# Patient Record
Sex: FEMALE | Race: WHITE | NOT HISPANIC OR LATINO | Employment: OTHER | URBAN - NONMETROPOLITAN AREA
[De-identification: names, ages, dates, MRNs, and addresses within clinical notes are randomized per-mention and may not be internally consistent; named-entity substitution may affect disease eponyms.]

---

## 2017-01-05 ENCOUNTER — AMBULATORY - GICH (OUTPATIENT)
Dept: GERIATRICS | Facility: OTHER | Age: 82
End: 2017-01-05

## 2017-01-05 DIAGNOSIS — I10 ESSENTIAL (PRIMARY) HYPERTENSION: ICD-10-CM

## 2017-02-13 ENCOUNTER — AMBULATORY - GICH (OUTPATIENT)
Dept: GERIATRICS | Facility: OTHER | Age: 82
End: 2017-02-13

## 2017-02-13 DIAGNOSIS — F02.80 DEMENTIA IN OTHER DISEASES CLASSIFIED ELSEWHERE WITHOUT BEHAVIORAL DISTURBANCE: ICD-10-CM

## 2017-02-13 DIAGNOSIS — G30.9 ALZHEIMER'S DISEASE (H): ICD-10-CM

## 2017-02-13 DIAGNOSIS — F02.80 ALZHEIMER'S DISEASE (H): ICD-10-CM

## 2017-02-15 ENCOUNTER — HOSPITAL - GICH (OUTPATIENT)
Dept: LAB | Facility: OTHER | Age: 82
End: 2017-02-15

## 2017-02-15 DIAGNOSIS — R63.4 ABNORMAL WEIGHT LOSS: ICD-10-CM

## 2017-02-15 LAB
A/G RATIO - HISTORICAL: 1.3 (ref 1–2)
ALBUMIN SERPL-MCNC: 3.1 G/DL (ref 3.5–5.7)
ALP SERPL-CCNC: 48 IU/L (ref 34–104)
ALT (SGPT) - HISTORICAL: 5 IU/L (ref 7–52)
ANION GAP - HISTORICAL: 6 (ref 5–18)
AST SERPL-CCNC: 9 IU/L (ref 13–39)
BILIRUB SERPL-MCNC: 0.6 MG/DL (ref 0.3–1)
BUN SERPL-MCNC: 14 MG/DL (ref 7–25)
BUN/CREAT RATIO - HISTORICAL: 16
CALCIUM SERPL-MCNC: 8.9 MG/DL (ref 8.6–10.3)
CHLORIDE SERPLBLD-SCNC: 109 MMOL/L (ref 98–107)
CO2 SERPL-SCNC: 26 MMOL/L (ref 21–31)
CREAT SERPL-MCNC: 0.9 MG/DL (ref 0.7–1.3)
ERYTHROCYTE [DISTWIDTH] IN BLOOD BY AUTOMATED COUNT: 13.2 % (ref 11.5–15.5)
GFR IF NOT AFRICAN AMERICAN - HISTORICAL: 60 ML/MIN/1.73M2
GLOBULIN - HISTORICAL: 2.4 G/DL (ref 2–3.7)
GLUCOSE SERPL-MCNC: 79 MG/DL (ref 70–105)
HCT VFR BLD AUTO: 38.3 % (ref 33–51)
HEMOGLOBIN: 13 G/DL (ref 12–16)
MCH RBC QN AUTO: 29.5 PG (ref 26–34)
MCHC RBC AUTO-ENTMCNC: 33.8 G/DL (ref 32–36)
MCV RBC AUTO: 87 FL (ref 80–100)
PLATELET # BLD AUTO: 156 THOU/CU MM (ref 140–440)
PMV BLD: 8.7 FL (ref 6.5–11)
POTASSIUM SERPL-SCNC: 4.1 MMOL/L (ref 3.5–5.1)
PROT SERPL-MCNC: 5.5 G/DL (ref 6.4–8.9)
RED BLOOD COUNT - HISTORICAL: 4.39 MIL/CU MM (ref 4–5.2)
SODIUM SERPL-SCNC: 141 MMOL/L (ref 133–143)
TSH - HISTORICAL: 1.49 UIU/ML (ref 0.34–5.6)
WHITE BLOOD COUNT - HISTORICAL: 3.2 THOU/CU MM (ref 4.5–11)

## 2017-03-08 ENCOUNTER — HISTORY (OUTPATIENT)
Dept: EMERGENCY MEDICINE | Facility: OTHER | Age: 82
End: 2017-03-08

## 2017-04-05 ENCOUNTER — AMBULATORY - GICH (OUTPATIENT)
Dept: GERIATRICS | Facility: OTHER | Age: 82
End: 2017-04-05

## 2017-04-05 DIAGNOSIS — G30.1 ALZHEIMER'S DISEASE WITH LATE ONSET (CODE) (H): ICD-10-CM

## 2017-04-05 DIAGNOSIS — I10 ESSENTIAL (PRIMARY) HYPERTENSION: ICD-10-CM

## 2017-04-05 DIAGNOSIS — F02.818 DEMENTIA IN OTHER DISEASES CLASSIFIED ELSEWHERE WITH BEHAVIORAL DISTURBANCE: ICD-10-CM

## 2017-04-05 DIAGNOSIS — E03.9 HYPOTHYROIDISM: ICD-10-CM

## 2017-06-12 ENCOUNTER — AMBULATORY - GICH (OUTPATIENT)
Dept: GERIATRICS | Facility: OTHER | Age: 82
End: 2017-06-12

## 2017-06-12 DIAGNOSIS — I48.20 CHRONIC ATRIAL FIBRILLATION (H): ICD-10-CM

## 2017-07-07 ENCOUNTER — AMBULATORY - GICH (OUTPATIENT)
Dept: SCHEDULING | Facility: OTHER | Age: 82
End: 2017-07-07

## 2017-08-09 ENCOUNTER — AMBULATORY - GICH (OUTPATIENT)
Dept: GERIATRICS | Facility: OTHER | Age: 82
End: 2017-08-09

## 2017-08-09 DIAGNOSIS — I48.20 CHRONIC ATRIAL FIBRILLATION (H): ICD-10-CM

## 2017-08-09 DIAGNOSIS — F02.80 DEMENTIA IN OTHER DISEASES CLASSIFIED ELSEWHERE WITHOUT BEHAVIORAL DISTURBANCE: ICD-10-CM

## 2017-08-09 DIAGNOSIS — G30.1 ALZHEIMER'S DISEASE WITH LATE ONSET (CODE) (H): ICD-10-CM

## 2017-09-14 ENCOUNTER — AMBULATORY - GICH (OUTPATIENT)
Dept: SCHEDULING | Facility: OTHER | Age: 82
End: 2017-09-14

## 2017-10-09 ENCOUNTER — AMBULATORY - GICH (OUTPATIENT)
Dept: GERIATRICS | Facility: OTHER | Age: 82
End: 2017-10-09

## 2017-10-09 DIAGNOSIS — I10 ESSENTIAL (PRIMARY) HYPERTENSION: ICD-10-CM

## 2017-10-18 ENCOUNTER — HOSPITAL - GICH (OUTPATIENT)
Dept: LAB | Facility: OTHER | Age: 82
End: 2017-10-18

## 2017-10-18 DIAGNOSIS — E03.9 HYPOTHYROIDISM: ICD-10-CM

## 2017-10-18 LAB
ALBUMIN SERPL-MCNC: 3.5 G/DL (ref 3.5–5.7)
PREALBUMIN - HISTORICAL: 23.9 MG/DL (ref 17–34)
T4 FREE SERPL-MCNC: 0.82 NG/DL (ref 0.58–1.64)
TSH - HISTORICAL: 4.31 UIU/ML (ref 0.34–5.6)

## 2017-12-21 ENCOUNTER — AMBULATORY - GICH (OUTPATIENT)
Dept: GERIATRICS | Facility: OTHER | Age: 82
End: 2017-12-21

## 2017-12-21 DIAGNOSIS — I10 ESSENTIAL (PRIMARY) HYPERTENSION: ICD-10-CM

## 2017-12-21 DIAGNOSIS — F02.80 DEMENTIA IN OTHER DISEASES CLASSIFIED ELSEWHERE WITHOUT BEHAVIORAL DISTURBANCE: ICD-10-CM

## 2017-12-21 DIAGNOSIS — G30.1 ALZHEIMER'S DISEASE WITH LATE ONSET (CODE) (H): ICD-10-CM

## 2017-12-22 ENCOUNTER — COMMUNICATION - GICH (OUTPATIENT)
Dept: FAMILY MEDICINE | Facility: OTHER | Age: 82
End: 2017-12-22

## 2017-12-27 NOTE — PROGRESS NOTES
Patient Information     Patient Name MRN Sex Lynne Covarrubias [id number] Female 1933      Progress Notes by Yudith Rubio NP at 10/9/2017  3:40 AM     Author:  Yudith Rubio NP Service:  (none) Author Type:  PHYS- Nurse Practitioner     Filed:  10/10/2017  7:29 AM Encounter Date:  10/9/2017 Status:  Signed     :  Yudith Rubio NP (PHYS- Nurse Practitioner)            This note has been dictated. The encounter number is [phone].

## 2017-12-27 NOTE — PROGRESS NOTES
Patient Information     Patient Name MRN Sex Lynne Covarrubias [id number] Female 1933      Progress Notes by Yudith Rubio NP at 2017  3:40 AM     Author:  Yudith Rubio NP Service:  (none) Author Type:  PHYS- Nurse Practitioner     Filed:  2017  7:35 AM Encounter Date:  2017 Status:  Signed     :  Yudith Rubio NP (PHYS- Nurse Practitioner)            This note has been dictated. The encounter number is [phone].

## 2017-12-28 NOTE — PROGRESS NOTES
Patient Information     Patient Name MRN Lynne Umana [id number] Female 1933      Progress Notes by French Katz MD at 2017  5:40 AM     Author:  French Katz MD Service:  (none) Author Type:  Physician     Filed:  2017  9:33 AM Encounter Date:  2017 Status:  Signed     :  French Katz MD (Physician)            .

## 2017-12-29 NOTE — H&P
Patient Information     Patient Name MRN Sex     Mini Dominguez [id number] Female 1933      H&P signed by French Katz MD at 2017  7:08 AM      Author:  French Katz MD Service:  (none) Author Type:  Physician     Filed:  2017  7:08 AM Encounter Date:  2017 Status:  Signed     :  French Katz MD (Physician)            -  DATE OF SERVICE:  2017    CHIEF COMPLAINT   60-day nursing home recertification.     HISTORY OF PRESENT ILLNESS  History is obtained through discussion with the nurse. Patient is unable to give any significant history due to her medical condition. Nursing reports no concerns. Weight has remained stable. She has very minimal behavior problems. Overall, they tell me that she appears happy and is not in distress.     Complete review of systems is unobtainable due to severe dementia.     MEDICATIONS  Please see medication list in Epic. This was updated and not changed.     OBJECTIVE  Weight is 231 pounds, blood pressure 146/81, pulse oximetry 98% on room air, pulse 70, respiratory rate 16, temperature 98.2. In general, she is alert, smiling, sitting at the table awaiting her lunch. Asked me simply to help her sit up more straight in her wheelchair, but does not answer any of my questions appropriately otherwise. Lower extremities are without edema.     ASSESSMENT  1. Advanced dementia.   2. Atrial fibrillation, stable.     PLAN  1. No changes were made to her medications.   2. Routine followup in 60 days for recertification again.    MD KORTNEY SCHMITT/barron   D:  2017 12:38:00  T:  2017 13:12:37  Voice Job ID:  [id number]  Text Job ID:  [id number]  2017;mdw   cc:NURSING HOME  LUPILLO CASON MD, PRIMARY PHYSICIAN         [facility] & [place], [place]:  MINI DOMINGUEZ  MR#:  [id number]  :  1933  DATE:  2017  LOCATION:  [facility]  ROOM:    TYPE:  CLINURSING HOME REPORTPage 1 of 1

## 2017-12-29 NOTE — H&P
Patient Information     Patient Name MRN Sex Lynne Covarrubias [id number] Female 1933      H&P signed by Yudith Rubio NP at 6/15/2017  7:40 AM      Author:  Yudith Rubio NP Service:  (none) Author Type:  PHYS- Nurse Practitioner     Filed:  6/15/2017  7:40 AM Encounter Date:  2017 Status:  Signed     :  Yudith Rubio NP (PHYS- Nurse Practitioner)            -  DATE OF SERVICE:  2017    EVERNAHEED JONES     CHIEF COMPLAINT   60 day recertification.     HISTORY OF PRESENT ILLNESS  Lynne has been stable except that her weight continues to decline. She is now down to 119 pounds. She is on Remeron 30 mg daily. She is also on Ensure and nutritional supplement. She has protein malnutrition with an albumin of 3.1 in February. She is not on any protein supplements otherwise. Thyroid studies have been stable when evaluated recently. She also has known chronic atrial fibrillation and is on metoprolol. No tachycardia. She has not had any falls or injuries. Otherwise, stable health status.     Past medical history, allergies, and medications were reviewed. Complete review of systems is discussed with nursing staff. Resident unable to give history secondary to severe dementia.     PHYSICAL EXAMINATION  GENERAL: Pleasant female, sitting on the edge of her bed. She is smiling. Does not appear uncomfortable.   VITAL SIGNS: Blood pressure 104/63, pulse 63, respiratory rate 20. Temp 97.5. SPO2 94% on room air.   HEENT: Sclerae are anicteric. Conjunctiva noninflamed. Mucous membranes moist.   NECK: Supple, without adenopathy.   LUNGS: Fields clear to auscultation.   CARDIOVASCULAR: Irregular with a controlled rate of 60 beats per minute. No S3 auscultated.   ABDOMEN: Soft and without masses, tenderness, and organomegaly.   EXTREMITIES: Without edema.     ASSESSMENT  1. Dementia.   2. Atrial fibrillation.   3. Hypothyroidism.   4. Weight loss.   5. Protein malnutrition.      PLAN  The weight loss continues. This has been unavoidable and likely secondary to her severe dementia. Recommend that she continue to be offered nutritional supplements. Also, may want to start her on ProSource 1 ounce twice daily to help build up her protein levels. No other changes in plan of care at this time.       NOHEMI PEREA/mary   D:  2017 15:44:30  T:  2017 16:07:12  Voice Job ID:  [id number]  Text Job ID:  [id number]  cc:NURSING HOME  LUPILLO CASON MD, PRIMARY PHYSICIAN         [facility] & [place], [place]:  MINI DOMINGUEZ  MR#:  [id number]  :  1933  DATE:  2017  LOCATION:  [facility]  ROOM:    TYPE:  CLINURSING HOME REPORTPage 1

## 2017-12-29 NOTE — H&P
Patient Information     Patient Name MRN Lynne Umana [id number] Female 1933      H&P signed by Yudith Rubio NP at 10/10/2017  7:18 AM      Author:  Yudith Rubio NP Service:  (none) Author Type:  PHYS- Nurse Practitioner     Filed:  10/10/2017  7:18 AM Encounter Date:  10/9/2017 Status:  Signed     :  Yudith Rubio NP (PHYS- Nurse Practitioner)            DATE OF SERVICE:  10/09/2017    CHIEF COMPLAINT:   A 60-day recertification.      HISTORY OF PRESENT ILLNESS:    Lynne is doing well.  She has had another 5-pound weight loss over the past year.  She is on nutritional supplement and also protein supplementation for the weight loss and protein malnutrition.  She has hypothyroidism, which was stable when checked in February.  Her albumin at that time was low at 3.1.  She had a CBC and chemistry panel that otherwise was stable.  Her blood pressure is 154/85 and she is on metoprolol XL.  She does not have any other blood pressure readings to evaluate.  She has a history of depression and insomnia and she is on Remeron 30 mg daily.      PAST MEDICAL HISTORY:    Reviewed.      ALLERGIES:    Reviewed.      MEDICATIONS:    Reviewed.      COMPLETE REVIEW OF SYSTEMS:    Discussed with nursing staff.  Resident not able to give a history as she has severe dementia.      PHYSICAL EXAMINATION:    VITAL SIGNS:  Blood pressure 154/85, pulse 63, respiratory rate 17, temp 98.3, weight 123 pounds.    GENERAL:  Pleasant female sitting on her bed, no acute distress.    SKIN:  Color pink.    HEENT:  Mucous membranes moist.    NECK:  Supple without adenopathy.  No thyromegaly.    LUNGS:  Fields clear to auscultation.    CARDIOVASCULAR:  Regular rate and rhythm.    ABDOMEN:  Soft and without masses, tenderness or organomegaly.    EXTREMITIES:  Without edema.      ASSESSMENT:    1.  Dementia.    2.  Depression.    3.  Hypertension.    4.  Protein malnutrition.    5.  Weight loss.     6.  Hypothyroidism.      PLAN:    We will plan to check an albumin, prealbumin, TSH and free T4 next lab day.  Dietician to evaluate her weight loss.  Recommend checking blood pressure 3 times weekly for 2 weeks and will follow up if greater than 50% of blood pressure readings have a systolic pressure greater than 150.  Otherwise, renew orders.      BRYCE PEREA/yaya  D:10/09/2017 15:27:48  T:10/10/2017 05:34:34  VJID: [id number]  TJID: [id number]    cc:RIAZ ESCOBAR MD

## 2018-01-02 NOTE — H&P
Patient Information     Patient Name MRN Sex     Mini Dominguez [id number] Female 1933      H&P signed by Yudith Rubio NP at 2017  7:30 AM      Author:  Yudith Rubio NP Service:  (none) Author Type:  PHYS- Nurse Practitioner     Filed:  2017  7:30 AM Encounter Date:  2017 Status:  Signed     :  Yudith Rubio NP (PHYS- Nurse Practitioner)            -  DATE OF SERVICE:  2017    ALEKSEY TERRACE REPORT     CHIEF COMPLAINT   Followup blood pressure.    HISTORY OF PRESENT ILLNESS  The patient was seen by primary doctor 2 weeks ago. There were episodes of orthostatic hypotension. She was on hydrochlorothiazide and this was discontinued. Her primary care doctor requested evaluation in 2 weeks. By reviewing her blood pressures, they average from 104/70 to 149/84 with 3 out of 8 readings above 140 systolic blood pressure. She has not had any falls or injuries. No syncope.     Past medical history, allergies, and medications reviewed.     PHYSICAL EXAMINATION  GENERAL: Pleasant female sitting at the dining table eating. She has dementia. She appears pleasant.   VITAL SIGNS:  Pulse 63, respiratory rate 18. Temperature 97.4. SPO2 98% on room air.   SKIN: Color pink. Mucous membranes moist.   LUNGS: Lung fields clear to auscultation.   CARDIOVASCULAR: Regular.   EXTREMITIES: Without edema.     ASSESSMENT  Hypertension.     PLAN  The patient has had recent problems with orthostatic hypotension. Hydrochlorothiazide was discontinued. The patient's blood pressures have been stable. No changes at this time.       NOHEMI PEREA/shawn   D:  2017 11:36:46  T:  2017 23:30:16  Voice Job ID:  [id number]  Text Job ID:  [id number]  cc:NURSING HOME  LUPILLO CASON MD, PRIMARY PHYSICIAN  MIKAYLA CAMPOS MD         [facility] & [place], [place]NAME:  MINI DOMINGUEZ  MR#:  [id number]  :  1933  DATE:   01/05/2017  LOCATION:  [facility]  ROOM:    TYPE:  CLINURSING HOME REPORTPage 1 of 1

## 2018-01-02 NOTE — PROGRESS NOTES
Patient Information     Patient Name MRN Sex Lynne Covarrubias [id number] Female 1933      Progress Notes by Yudith Rubio NP at 2017  3:40 AM     Author:  Yudith Rubio NP Service:  (none) Author Type:  PHYS- Nurse Practitioner     Filed:  2017  7:56 AM Encounter Date:  2017 Status:  Signed     :  Yudith Rubio NP (PHYS- Nurse Practitioner)            This note has been dictated. The encounter number is [phone].

## 2018-01-03 NOTE — PROGRESS NOTES
Patient Information     Patient Name MRN Sex Lynne Covarrubias [id number] Female 1933      Progress Notes by Yudith Rubio NP at 2017  3:20 AM     Author:  Yudith Rubio NP Service:  (none) Author Type:  PHYS- Nurse Practitioner     Filed:  2/15/2017  8:46 AM Encounter Date:  2017 Status:  Signed     :  Yudith Rubio NP (PHYS- Nurse Practitioner)            This note has been dictated. The encounter number is [phone].

## 2018-01-03 NOTE — H&P
Patient Information     Patient Name MRN Sex Lynne Covarrubias [id number] Female 1933      H&P signed by Yudith Rubio NP at 2017  7:23 AM      Author:  Yudith Rubio NP Service:  (none) Author Type:  PHYS- Nurse Practitioner     Filed:  2017  7:23 AM Encounter Date:  2017 Status:  Signed     :  Yudith Rubio NP (PHYS- Nurse Practitioner)            -  DATE OF SERVICE:  2017    ALEKSEY JONES    CHIEF COMPLAINT   60-day recertification.     HISTORY OF PRESENT ILLNESS  Lynne continues to lose weight. She is down 7 pounds since September. She is on Ensure twice daily and additional nutritional supplement three times daily. She is also on Remeron 7.5 mg daily. She has known hypothyroidism that is replaced with levothyroxine 50 mcg daily. She had a normal TSH in August. She does have chronic diarrhea. Nursing staff suspect it is less than five loose stools per day, but do not know for sure. They are not sure that this has worsened. She is not on any bowel medications. She otherwise has done well. She is on a beta blocker for rate control of her atrial fibrillation.     Past medical history, allergies and medications are reviewed.     Complete review of systems discussed with nursing staff. Residence has severe dementia and is unable to give history.     PHYSICAL EXAMINATION  Blood pressure 130/74, pulse 64, respiratory rate 16, temperature 97.4, SpO2 98% on room air. Pleasant female without distress. She is smiling. She is sitting on the side of her bed. She has severe dementia. Skin color pink. Mucous membranes moist. Sclerae nonicteric. Conjunctivae noninflamed. Neck supple and without adenopathy. Lung fields clear to auscultation throughout.   Cardiovascular exam is irregularly irregular with controlled rate. Abdomen soft and without masses, tenderness or organomegaly. Extremities with trace bilateral edema.     Labs discussed in HPI.      ASSESSMENT  1. Dementia.   2. Atrial fibrillation.   3. Weight loss.   4. Hypothyroidism.   5. Chronic diarrhea.     PLAN  1. I have asked nursing staff to keep a record of her loose stools so we know the frequency and if they are loose or watery. They are to keep track of all stools over the next week, and I will followup in one week.   2. In the meantime, increase Remeron to 15 mg at bedtime. Nursing staff report she does not eat well. Hopefully this will improve her appetite. Will followup in one month on the Remeron. If there is no improvement in her weight, then will return back to current dose of 7.5 mg. Also, at next lab day will check CBC, chemistry panel and TSH for diagnoses of weight loss and hypothyroidism.       NOHEMI PEREA/barron   D:  2017 16:12:34  T:  02/15/2017 16:20:54  Voice Job ID:  [id number]  Text Job ID:  [id number]  cc:NURSING HOME  LUPILLO CASON MD, PRIMARY PHYSICIAN  RIAZ ESCOBAR MD         [facility] & [place], [place]:  MINI DOMINGUEZ  MR#:  [id number]  :  1933  DATE:  2017  LOCATION:  [facility]  ROOM:    TYPE:  CLINURSING HOME REPORTPage 1 of 2

## 2018-01-04 NOTE — PROGRESS NOTES
Patient Information     Patient Name MRN Lynne Umana [id number] Female 1933      Progress Notes by French Katz MD at 2017  5:30 AM     Author:  French Katz MD Service:  (none) Author Type:  Physician     Filed:  2017 12:20 PM Encounter Date:  2017 Status:  Signed     :  French Katz MD (Physician)            .
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
## 2018-01-24 ENCOUNTER — DOCUMENTATION ONLY (OUTPATIENT)
Dept: FAMILY MEDICINE | Facility: OTHER | Age: 83
End: 2018-01-24

## 2018-01-24 RX ORDER — MIRTAZAPINE 30 MG/1
1 TABLET, FILM COATED ORAL AT BEDTIME
COMMUNITY
End: 2022-01-01

## 2018-01-24 RX ORDER — METOPROLOL TARTRATE 50 MG
12.5 TABLET ORAL DAILY
COMMUNITY
End: 2022-01-01

## 2018-01-24 RX ORDER — ASPIRIN 81 MG/1
1 TABLET, CHEWABLE ORAL
COMMUNITY
Start: 2016-12-23

## 2018-01-24 RX ORDER — BRIMONIDINE TARTRATE AND TIMOLOL MALEATE 2; 5 MG/ML; MG/ML
1 SOLUTION OPHTHALMIC 2 TIMES DAILY
COMMUNITY
Start: 2014-03-27 | End: 2022-01-01

## 2018-01-24 RX ORDER — LEVOTHYROXINE SODIUM 50 UG/1
1 TABLET ORAL
COMMUNITY
Start: 2016-04-04

## 2018-02-12 NOTE — PROGRESS NOTES
Patient Information     Patient Name MRN Lynne Umana [id number] Female 1933      Progress Notes by French Katz MD at 2017  3:20 AM     Author:  French Katz MD Service:  (none) Author Type:  Physician     Filed:  2017  3:33 PM Encounter Date:  2017 Status:  Signed     :  French Katz MD (Physician)            60 day nursing home recertification    HPI: patient is unable to communicate.  History from [facility] staff at nursing home.  She has not fallen recently.  Appears not depressed and not agitated.  No apparent pain.      Current Outpatient Prescriptions:      aspirin chewable 81 mg chewable tablet, Take 1 tablet by mouth once daily with a meal., Disp: 100 tablet, Rfl: 0     brimonidine-timolol (COMBIGAN) 0.2-0.5 % ophthalmic solution, Place 1 Drop into left eye 2 times daily., Disp: 5 mL, Rfl: 0     haloperidol (HALDOL TABLET) 0.5 mg tablet, Take 1 tablet by mouth every 6 hours if needed for Agitation, Hallucinations or Delirium., Disp: 30 tablet, Rfl: 0     levothyroxine (SYNTHROID) 50 mcg tablet, Take 1 tablet by mouth before breakfast., Disp: , Rfl:      metoprolol tartrate (LOPRESSOR) 25 mg tablet, Take 0.5 tablets by mouth 2 times daily. 0.5 mg po BID, Disp: 60 tablet, Rfl: 0     milk of magnesia (MOM) (400 mg in 5 mL), Take 30 mL by mouth once daily if needed., Disp: , Rfl:      mirtazapine (REMERON) 7.5 mg tablet, Take 1 tablet by mouth at bedtime., Disp: , Rfl:   Medications have been reviewed by me and are current to the best of my knowledge and ability.    Past Medical History:     Diagnosis  Date     Agoraphobia      Anxiety      Aspiration pneumonia due to inhalation of vomitus (HC) 2014     Atrial fibrillation (HC) 2012     BAKER'S CYST, RIGHT KNEE 2011     Chronic diarrhea      CHRONIC KIDNEY DISEASE STAGE III (MODERATE)      COLITIS     Microscopic colitis      CVA (cerebral infarction) 2012    Acute Infarct, posterior left  parietal region       Fibrocystic breast disease      GANGLION CYST 6/7/2012     Hashimoto's thyroiditis      HYPERCHOLESTEROLEMIA      HYPERTENSION      HYPOTHYROIDISM      OBESITY      OSTEOPENIA      ROTATOR CUFF INJURY, RIGHT SHOULDER 4/9/2012     Squamous cell carcinoma in situ     removed from upper back      Past Medical History:     Diagnosis  Date     Agoraphobia      Anxiety      Aspiration pneumonia due to inhalation of vomitus (HC) 1/14/2014     Atrial fibrillation (HC) 12/28/2012     BAKER'S CYST, RIGHT KNEE 9/6/2011     Chronic diarrhea      CHRONIC KIDNEY DISEASE STAGE III (MODERATE)      COLITIS     Microscopic colitis      CVA (cerebral infarction) 12/2/2012    Acute Infarct, posterior left parietal region       Fibrocystic breast disease      GANGLION CYST 6/7/2012     Hashimoto's thyroiditis      HYPERCHOLESTEROLEMIA      HYPERTENSION      HYPOTHYROIDISM      OBESITY      OSTEOPENIA      ROTATOR CUFF INJURY, RIGHT SHOULDER 4/9/2012     Squamous cell carcinoma in situ     removed from upper back      Past Surgical History:      Procedure  Laterality Date     BREAST BIOPSY      Breast biopsy x 2, both benign       CARPAL TUNNEL RELEASE  2011            Right carpal tunnel release and trigger fingers x 2       CATARACT REMOVAL  2009            Bilateral cataract surgery       COLONOSCOPY SCREENING  1999     KNEE ARTHROSCOPY  09/04    Status post left knee arthroscopy       TUBAL LIGATION       Past Surgical History:      Procedure  Laterality Date     BREAST BIOPSY      Breast biopsy x 2, both benign       CARPAL TUNNEL RELEASE  2011            Right carpal tunnel release and trigger fingers x 2       CATARACT REMOVAL  2009            Bilateral cataract surgery       COLONOSCOPY SCREENING  1999     KNEE ARTHROSCOPY  09/04    Status post left knee arthroscopy       TUBAL LIGATION       REVIEW OF SYSTEMS: unable due to dementia    EXAM:  122#, 106/58, 62, 18  General Appearance: Pleasant, alert,  appropriate appearance for age. No acute distress.  Sleeping in her room, arouses easily.  Chest/Respiratory Exam: Normal chest wall and respirations. Clear to auscultation.  Cardiovascular Exam: Regular rate and rhythm. S1, S2, no murmur, click, gallop, or rubs.  Neurologic Exam: vocal but uninteligible.  Is pleasant and can be redirected easily.      A/P  (G30.1,  F02.80) Late onset Alzheimer's disease without behavioral disturbance  (primary encounter diagnosis)  Comment: end stage  Plan: no med changes    (I10) Hypertension  Comment: stable  Plan: no med changes.  Follow up in 60 days.    French Katz MD ....................  12/21/2017   4:05 PM

## 2018-02-12 NOTE — TELEPHONE ENCOUNTER
Patient Information     Patient Name MRN Sex Lynne Covarrubias [id number] Female 1933      Telephone Encounter by Olga Lidia Khan at 2017  5:39 PM     Author:  Olga Lidia Khan Service:  (none) Author Type:  NURS- Student Practical Nurse     Filed:  2017  5:49 PM Encounter Date:  2017 Status:  Signed     :  Olga Lidia Khan (NURS- Student Practical Nurse)            Med Rec Olga Lidia Khan LPN .............2017  5:39 PM

## 2018-02-19 ENCOUNTER — NURSING HOME VISIT (OUTPATIENT)
Dept: INTERNAL MEDICINE | Facility: OTHER | Age: 83
End: 2018-02-19
Payer: COMMERCIAL

## 2018-02-19 VITALS — HEART RATE: 90 BPM | SYSTOLIC BLOOD PRESSURE: 115 MMHG | DIASTOLIC BLOOD PRESSURE: 68 MMHG | RESPIRATION RATE: 16 BRPM

## 2018-02-19 DIAGNOSIS — F02.818 LATE ONSET ALZHEIMER'S DISEASE WITH BEHAVIORAL DISTURBANCE (H): Primary | ICD-10-CM

## 2018-02-19 DIAGNOSIS — F33.40 RECURRENT MAJOR DEPRESSIVE DISORDER, IN REMISSION (H): ICD-10-CM

## 2018-02-19 DIAGNOSIS — G30.1 LATE ONSET ALZHEIMER'S DISEASE WITH BEHAVIORAL DISTURBANCE (H): Primary | ICD-10-CM

## 2018-02-19 DIAGNOSIS — I10 ESSENTIAL HYPERTENSION: ICD-10-CM

## 2018-02-19 DIAGNOSIS — I63.9 CEREBRAL INFARCTION, UNSPECIFIED MECHANISM (H): ICD-10-CM

## 2018-02-19 DIAGNOSIS — E03.9 ACQUIRED HYPOTHYROIDISM: ICD-10-CM

## 2018-02-19 DIAGNOSIS — E44.1 MILD PROTEIN-CALORIE MALNUTRITION (H): ICD-10-CM

## 2018-02-19 DIAGNOSIS — I48.20 CHRONIC ATRIAL FIBRILLATION (H): ICD-10-CM

## 2018-02-19 PROCEDURE — 99309 SBSQ NF CARE MODERATE MDM 30: CPT | Performed by: NURSE PRACTITIONER

## 2018-02-19 ASSESSMENT — ENCOUNTER SYMPTOMS
CHOKING: 0
POLYDIPSIA: 0
AGITATION: 0
HEARTBURN: 0
FEVER: 0
NAUSEA: 0
ACTIVITY CHANGE: 0
EYE REDNESS: 0
PALPITATIONS: 0
HEMATOCHEZIA: 0
TROUBLE SWALLOWING: 0
WHEEZING: 0
DIARRHEA: 0
SORE THROAT: 0
ABDOMINAL PAIN: 0
COUGH: 0
EYE DISCHARGE: 0
SHORTNESS OF BREATH: 0
SEIZURES: 0
NERVOUS/ANXIOUS: 0
DYSURIA: 0
ADENOPATHY: 0
VOMITING: 0
JOINT SWELLING: 0
DIZZINESS: 0
CONFUSION: 1
TREMORS: 0
POLYPHAGIA: 0
CONSTIPATION: 0
HEMATURIA: 0
UNEXPECTED WEIGHT CHANGE: 0
SLEEP DISTURBANCE: 0
APPETITE CHANGE: 0

## 2018-02-19 NOTE — LETTER
2/19/2018        RE: Lynne Gutierrez  [address]  [facility] [address]          [facility]    Chief Complaint   Patient presents with     care home Regulatory       HPI:    Lynne Gutierrez is a 84 year old  (5/30/1933), who is being seen today for a federally mandated E/M visit at [facility].  HPI information obtained from: facility staff.  Resident has Alzheimer's disease and is unable to give history.  Family not available for this visit.    1. Late onset Alzheimer's disease with behavioral disturbance  Occasional hollering out.  Usually becomes upset if she has to sit in a new spot at the dining table.  Noncombative.    2. Cerebral infarction, unspecified mechanism (H)  Currently on aspirin 81 mg daily.  No defect in motor skills.    3. Chronic atrial fibrillation (H)  She is not anticoagulated.  At risk of falls and injuries due to weakness and Alzheimer's disease.    4. Essential hypertension  Nursing home blood pressures well controlled.  Blood pressure reading on February 4 was 115/70 and today's recording of 115/68.  On Toprol-XL.  No bradycardia.  No wheezing.    5. Acquired hypothyroidism  Last TSH and free T4 October 2018 normal.  Continues on daily Synthroid.    6. Mild protein-calorie malnutrition (H)  Patient was having weight loss and found to have low albumin back in June.  Her weight has dropped down 5 pounds.  She was started on 3 times daily nutritional drink and protein supplements.  Her weight is now back up 6 pounds since last year.  Albumin and prealbumin labs were checked in October and within normal range.    7. Recurrent major depressive disorder, in remission (H)  Asymptomatic.  On Remeron 15 mg at bedtime.  Medication also used for appetite stimulation.  Sleeps well at nighttime.      Past medical history, past surgical history, social history, allergies and medication list available at [facility] are reviewed today.      Case  Management:  I have reviewed the care plan and MDS and do agree with the plan. Patient's desire to return to the community is not assessible due to cognitive impairment.  Information reviewed:  Medications, vital signs, orders, and nursing notes.    ROS:  Review of Systems   Constitutional: Negative for activity change, appetite change, fever and unexpected weight change.   HENT: Negative for congestion, mouth sores, sore throat and trouble swallowing.    Eyes: Negative for discharge and redness.   Respiratory: Negative for cough, choking, shortness of breath and wheezing.    Cardiovascular: Negative for chest pain, palpitations and peripheral edema.   Gastrointestinal: Negative for abdominal pain, constipation, diarrhea, heartburn, hematochezia, nausea and vomiting.   Endocrine: Negative for cold intolerance, heat intolerance, polydipsia, polyphagia and polyuria.   Genitourinary: Negative for dysuria and hematuria.   Musculoskeletal: Negative for joint swelling.   Skin: Negative for rash.   Neurological: Negative for dizziness, tremors and seizures.   Hematological: Negative for adenopathy.   Psychiatric/Behavioral: Positive for behavioral problems and confusion. Negative for agitation and sleep disturbance. The patient is not nervous/anxious.        Exam:  Vitals: /68  Pulse 90  Resp 16  BMI= There is no height or weight on file to calculate BMI.  Physical Exam   Constitutional: She appears well-developed and well-nourished. No distress.   HENT:   Mouth/Throat: Oropharynx is clear and moist. No oropharyngeal exudate.   Eyes: Conjunctivae are normal. Right eye exhibits no discharge. Left eye exhibits no discharge. No scleral icterus.   Neck: Neck supple. No thyromegaly present.   Cardiovascular: Normal rate.  Exam reveals no gallop.    Irregular   Pulmonary/Chest: Effort normal and breath sounds normal.   Abdominal: Soft. She exhibits no distension and no mass. There is no tenderness.   No  hepatosplenomegaly   Musculoskeletal: She exhibits no edema or tenderness.   Lymphadenopathy:     She has no cervical adenopathy.   Neurological: She is alert.   Skin: Skin is warm. No rash noted. She is not diaphoretic. No pallor.   Psychiatric:   Poor judgment, confusion   Nursing note and vitals reviewed.      Lab/Diagnostic data:   October 2017: Albumin 3.5, pre-albumin 23.9  CBC RESULTS:   Recent Labs   Lab Test  02/15/17   0920  12/03/16   0847   HGB  13.0  15.2   HCT  38.3  45.7   MCV  87  88   MCH  29.5  29.5   MCHC  33.8  33.3   RDW  13.2  12.9   PLT  156  176       Last Basic Metabolic Panel:  Recent Labs   Lab Test  02/15/17   0944  12/03/16   0910   NA  141  141   POTASSIUM  4.1  4.3   CHLORIDE  109*  108*   TITI  8.9  10.4*   CO2  26  26   BUN  14  12   CR  0.90  0.95   GLC  79  94       Liver Function Studies -   Recent Labs   Lab Test  10/18/17   1334  02/15/17   0944  12/03/16   0910   PROTTOTAL   --   5.5*  6.9   ALBUMIN  3.5  3.1*  3.8   BILITOTAL   --   0.6  0.7   ALKPHOS   --   48  51             ASSESSMENT/PLAN  1. Late onset Alzheimer's disease with behavioral disturbance  Currently stable, rare behaviors.    2. Cerebral infarction, unspecified mechanism (H)  Continue aspirin 81 mg daily and well-controlled hypertension.    3. Chronic atrial fibrillation (H)  Not candidate for chronic anticoagulation due to risk of falls secondary to weakness and dementia.    4. Essential hypertension  Blood pressure well controlled on Toprol-XL.  No bradycardia.    5. Acquired hypothyroidism  Continue with daily Synthroid.  Labs up-to-date in October 2017.  Asymptomatic.  She has had weight gain but that was secondary to dietary changes.    6. Mild protein-calorie malnutrition (H)  Stable albumin and prealbumin in October 2017.  Weight is up 6 pounds since June.  She is on protein supplement and nutritional drink 3 times daily.  Will reduce with nutritional drink down to twice daily.  Also is on Remeron.  If  weight gain becomes abnormal then will continue to back off on supplements.    7. Recurrent major depressive disorder, in remission (H)  Depression well controlled with Remeron.  No problems with insomnia.  Continue with 15 mg every bedtime dose.            Electronically signed by:  NATHALIE Ashton   2/19/2018  3:42 PM        Sincerely,        Yudith Rubio NP

## 2018-02-19 NOTE — PROGRESS NOTES
[facility]    Chief Complaint   Patient presents with     long term Regulatory       HPI:    Lynne Gutierrez is a 84 year old  (5/30/1933), who is being seen today for a federally mandated E/M visit at [facility].  HPI information obtained from: facility staff.  Resident has Alzheimer's disease and is unable to give history.  Family not available for this visit.    1. Late onset Alzheimer's disease with behavioral disturbance  Occasional hollering out.  Usually becomes upset if she has to sit in a new spot at the dining table.  Noncombative.    2. Cerebral infarction, unspecified mechanism (H)  Currently on aspirin 81 mg daily.  No defect in motor skills.    3. Chronic atrial fibrillation (H)  She is not anticoagulated.  At risk of falls and injuries due to weakness and Alzheimer's disease.    4. Essential hypertension  Nursing home blood pressures well controlled.  Blood pressure reading on February 4 was 115/70 and today's recording of 115/68.  On Toprol-XL.  No bradycardia.  No wheezing.    5. Acquired hypothyroidism  Last TSH and free T4 October 2018 normal.  Continues on daily Synthroid.    6. Mild protein-calorie malnutrition (H)  Patient was having weight loss and found to have low albumin back in June.  Her weight has dropped down 5 pounds.  She was started on 3 times daily nutritional drink and protein supplements.  Her weight is now back up 6 pounds since last year.  Albumin and prealbumin labs were checked in October and within normal range.    7. Recurrent major depressive disorder, in remission (H)  Asymptomatic.  On Remeron 15 mg at bedtime.  Medication also used for appetite stimulation.  Sleeps well at nighttime.      Past medical history, past surgical history, social history, allergies and medication list available at [facility] are reviewed today.      Case Management:  I have reviewed the care plan and MDS and do agree with the plan. Patient's desire to  return to the community is not assessible due to cognitive impairment.  Information reviewed:  Medications, vital signs, orders, and nursing notes.    ROS:  Review of Systems   Constitutional: Negative for activity change, appetite change, fever and unexpected weight change.   HENT: Negative for congestion, mouth sores, sore throat and trouble swallowing.    Eyes: Negative for discharge and redness.   Respiratory: Negative for cough, choking, shortness of breath and wheezing.    Cardiovascular: Negative for chest pain, palpitations and peripheral edema.   Gastrointestinal: Negative for abdominal pain, constipation, diarrhea, heartburn, hematochezia, nausea and vomiting.   Endocrine: Negative for cold intolerance, heat intolerance, polydipsia, polyphagia and polyuria.   Genitourinary: Negative for dysuria and hematuria.   Musculoskeletal: Negative for joint swelling.   Skin: Negative for rash.   Neurological: Negative for dizziness, tremors and seizures.   Hematological: Negative for adenopathy.   Psychiatric/Behavioral: Positive for behavioral problems and confusion. Negative for agitation and sleep disturbance. The patient is not nervous/anxious.        Exam:  Vitals: /68  Pulse 90  Resp 16  BMI= There is no height or weight on file to calculate BMI.  Physical Exam   Constitutional: She appears well-developed and well-nourished. No distress.   HENT:   Mouth/Throat: Oropharynx is clear and moist. No oropharyngeal exudate.   Eyes: Conjunctivae are normal. Right eye exhibits no discharge. Left eye exhibits no discharge. No scleral icterus.   Neck: Neck supple. No thyromegaly present.   Cardiovascular: Normal rate.  Exam reveals no gallop.    Irregular   Pulmonary/Chest: Effort normal and breath sounds normal.   Abdominal: Soft. She exhibits no distension and no mass. There is no tenderness.   No hepatosplenomegaly   Musculoskeletal: She exhibits no edema or tenderness.   Lymphadenopathy:     She has no  cervical adenopathy.   Neurological: She is alert.   Skin: Skin is warm. No rash noted. She is not diaphoretic. No pallor.   Psychiatric:   Poor judgment, confusion   Nursing note and vitals reviewed.      Lab/Diagnostic data:   October 2017: Albumin 3.5, pre-albumin 23.9  CBC RESULTS:   Recent Labs   Lab Test  02/15/17   0920  12/03/16   0847   HGB  13.0  15.2   HCT  38.3  45.7   MCV  87  88   MCH  29.5  29.5   MCHC  33.8  33.3   RDW  13.2  12.9   PLT  156  176       Last Basic Metabolic Panel:  Recent Labs   Lab Test  02/15/17   0944  12/03/16   0910   NA  141  141   POTASSIUM  4.1  4.3   CHLORIDE  109*  108*   TITI  8.9  10.4*   CO2  26  26   BUN  14  12   CR  0.90  0.95   GLC  79  94       Liver Function Studies -   Recent Labs   Lab Test  10/18/17   1334  02/15/17   0944  12/03/16   0910   PROTTOTAL   --   5.5*  6.9   ALBUMIN  3.5  3.1*  3.8   BILITOTAL   --   0.6  0.7   ALKPHOS   --   48  51             ASSESSMENT/PLAN  1. Late onset Alzheimer's disease with behavioral disturbance  Currently stable, rare behaviors.    2. Cerebral infarction, unspecified mechanism (H)  Continue aspirin 81 mg daily and well-controlled hypertension.    3. Chronic atrial fibrillation (H)  Not candidate for chronic anticoagulation due to risk of falls secondary to weakness and dementia.    4. Essential hypertension  Blood pressure well controlled on Toprol-XL.  No bradycardia.    5. Acquired hypothyroidism  Continue with daily Synthroid.  Labs up-to-date in October 2017.  Asymptomatic.  She has had weight gain but that was secondary to dietary changes.    6. Mild protein-calorie malnutrition (H)  Stable albumin and prealbumin in October 2017.  Weight is up 6 pounds since June.  She is on protein supplement and nutritional drink 3 times daily.  Will reduce with nutritional drink down to twice daily.  Also is on Remeron.  If weight gain becomes abnormal then will continue to back off on supplements.    7. Recurrent major depressive  disorder, in remission (H)  Depression well controlled with Remeron.  No problems with insomnia.  Continue with 15 mg every bedtime dose.            Electronically signed by:  NATHALIE Ashton   2/19/2018  3:42 PM

## 2018-02-19 NOTE — MR AVS SNAPSHOT
"              After Visit Summary   2/19/2018    Lynne Gutierrez    MRN: [id number]           Patient Information     Date Of Birth          5/30/1933        Visit Information        Provider Department      2/19/2018 2:26 PM Yudith Rubio NP [facility]        Today's Diagnoses     Late onset Alzheimer's disease with behavioral disturbance    -  1    Cerebral infarction, unspecified mechanism (H)        Chronic atrial fibrillation (H)        Essential hypertension        Acquired hypothyroidism        Mild protein-calorie malnutrition (H)        Recurrent major depressive disorder, in remission (H)           Follow-ups after your visit        Who to contact     If you have questions or need follow up information about today's clinic visit or your schedule please contact [facility] directly at [phone].  Normal or non-critical lab and imaging results will be communicated to you by turboBOTZhart, letter or phone within 4 business days after the clinic has received the results. If you do not hear from us within 7 days, please contact the clinic through turboBOTZhart or phone. If you have a critical or abnormal lab result, we will notify you by phone as soon as possible.  Submit refill requests through [employer] or call your pharmacy and they will forward the refill request to us. Please allow 3 business days for your refill to be completed.          Additional Information About Your Visit        turboBOTZharHome Online Income Systems Information     [employer] lets you send messages to your doctor, view your test results, renew your prescriptions, schedule appointments and more. To sign up, go to [email or web address] . Click on \"Log in\" on the left side of the screen, which will take you to the Welcome page. Then click on \"Sign up Now\" on the right side of the page.     You will be asked to enter the access code listed below, as well as some personal information. Please follow the directions to create " your username and password.     Your access code is: [email or web address]  Expires: 2018  3:44 PM     Your access code will  in 90 days. If you need help or a new code, please call your [facility] or [phone].        Care EveryWhere ID     This is your Care EveryWhere ID. This could be used by other organizations to access your [place] medical records  [id number]        Your Vitals Were     Pulse Respirations                90 16           Blood Pressure from Last 3 Encounters:   18 115/68   10/14/16 120/72   16 118/70    Weight from Last 3 Encounters:   14 151 lb (68.5 kg)   12 169 lb (76.7 kg)   12 168 lb 3.2 oz (76.3 kg)              Today, you had the following     No orders found for display       Primary Care Provider    None Specified       No primary provider on file.        Equal Access to Services     [facility] : Hadii parker Alegre, waanuradha barkley, qaguilherme kaalmanicko chavez, chela manning . So [facility] [phone].    ATENCIÓN: Si habla español, tiene a baez disposición servicios gratuitos de asistencia lingüística. Llame al [phone].    We comply with applicable federal civil rights laws and [place] laws. We do not discriminate on the basis of race, color, national origin, age, disability, sex, sexual orientation, or gender identity.            Thank you!     Thank you for choosing [facility] AND [facility]  for your care. Our goal is always to provide you with excellent care. Hearing back from our patients is one way we can continue to improve our services. Please take a few minutes to complete the written survey that you may receive in the mail after your visit with us. Thank you!             Your Updated Medication List - Protect others around you: Learn how to safely use, store and throw away your medicines at www.disposemymeds.org.          This list is accurate as of 18  3:44 PM.  Always use your most  recent med list.                   Brand Name Dispense Instructions for use Diagnosis    aspirin 81 MG chewable tablet      Take 1 tablet by mouth daily with food        brimonidine-timolol 0.2-0.5 % ophthalmic solution    COMBIGAN     Place 1 drop Into the left eye 2 times daily        levothyroxine 50 MCG tablet    SYNTHROID/LEVOTHROID     Take 1 tablet by mouth every morning (before breakfast)        magnesium hydroxide 400 MG/5ML suspension    MILK OF MAGNESIA     Take 30 mLs by mouth daily as needed        metoprolol tartrate 50 MG tablet    LOPRESSOR     Take 12.5 mg by mouth daily        mirtazapine 30 MG tablet    REMERON     Take 1 tablet by mouth At Bedtime

## 2018-03-27 ENCOUNTER — TRANSFERRED RECORDS (OUTPATIENT)
Dept: HEALTH INFORMATION MANAGEMENT | Facility: OTHER | Age: 83
End: 2018-03-27

## 2018-04-19 ENCOUNTER — NURSING HOME VISIT (OUTPATIENT)
Dept: GERIATRICS | Facility: OTHER | Age: 83
End: 2018-04-19
Attending: FAMILY MEDICINE
Payer: COMMERCIAL

## 2018-04-19 DIAGNOSIS — F02.80 LATE ONSET ALZHEIMER'S DISEASE WITHOUT BEHAVIORAL DISTURBANCE (H): Primary | ICD-10-CM

## 2018-04-19 DIAGNOSIS — G30.1 LATE ONSET ALZHEIMER'S DISEASE WITHOUT BEHAVIORAL DISTURBANCE (H): Primary | ICD-10-CM

## 2018-04-19 PROCEDURE — 99307 SBSQ NF CARE SF MDM 10: CPT | Performed by: FAMILY MEDICINE

## 2018-04-19 NOTE — MR AVS SNAPSHOT
"              After Visit Summary   2018    Lynne Gutierrez    MRN: [id number]           Patient Information     Date Of Birth          1933        Visit Information        Provider Department      2018 3:30 AM French Katz MD [facility]        Today's Diagnoses     Late onset Alzheimer's disease without behavioral disturbance    -  1       Follow-ups after your visit        Who to contact     If you have questions or need follow up information about today's clinic visit or your schedule please contact [facility] directly at [phone].  Normal or non-critical lab and imaging results will be communicated to you by Your Practical Solutionshart, letter or phone within 4 business days after the clinic has received the results. If you do not hear from us within 7 days, please contact the clinic through Archivet or phone. If you have a critical or abnormal lab result, we will notify you by phone as soon as possible.  Submit refill requests through [employer] or call your pharmacy and they will forward the refill request to us. Please allow 3 business days for your refill to be completed.          Additional Information About Your Visit        MyChart Information     [employer] lets you send messages to your doctor, view your test results, renew your prescriptions, schedule appointments and more. To sign up, go to [email or web address] . Click on \"Log in\" on the left side of the screen, which will take you to the Welcome page. Then click on \"Sign up Now\" on the right side of the page.     You will be asked to enter the access code listed below, as well as some personal information. Please follow the directions to create your username and password.     Your access code is: [email or web address]  Expires: 2018  4:44 PM     Your access code will  in 90 days. If you need help or a new code, please call your [place] clinic or [phone].        Care EveryWhere ID     This is your " Care EveryWhere ID. This could be used by other organizations to access your [place] medical records  [id number]         Blood Pressure from Last 3 Encounters:   02/19/18 115/68   10/14/16 120/72   09/22/16 118/70    Weight from Last 3 Encounters:   03/27/14 151 lb (68.5 kg)   09/27/12 169 lb (76.7 kg)   09/14/12 168 lb 3.2 oz (76.3 kg)              Today, you had the following     No orders found for display       Primary Care Provider Office Phone # Fax #    French Katz [phone]       [address] [facility] [address]        Equal Access to Services     JOSUE RILEY : Sera Alegre, waanuradha barkley, qaybta kaalmada scott, chela manning . So [facility] [phone].    ATENCIÓN: Si habla español, tiene a baez disposición servicios gratuitos de asistencia lingüística. Llame al [phone].    We comply with applicable federal civil rights laws and [place] laws. We do not discriminate on the basis of race, color, national origin, age, disability, sex, sexual orientation, or gender identity.            Thank you!     Thank you for choosing [facility] AND [facility]  for your care. Our goal is always to provide you with excellent care. Hearing back from our patients is one way we can continue to improve our services. Please take a few minutes to complete the written survey that you may receive in the mail after your visit with us. Thank you!             Your Updated Medication List - Protect others around you: Learn how to safely use, store and throw away your medicines at www.disposemymeds.org.          This list is accurate as of 4/19/18 11:59 PM.  Always use your most recent med list.                   Brand Name Dispense Instructions for use Diagnosis    aspirin 81 MG chewable tablet      Take 1 tablet by mouth daily with food        brimonidine-timolol 0.2-0.5 % ophthalmic solution    COMBIGAN     Place 1 drop Into the left eye 2  times daily        levothyroxine 50 MCG tablet    SYNTHROID/LEVOTHROID     Take 1 tablet by mouth every morning (before breakfast)        magnesium hydroxide 400 MG/5ML suspension    MILK OF MAGNESIA     Take 30 mLs by mouth daily as needed        metoprolol tartrate 50 MG tablet    LOPRESSOR     Take 12.5 mg by mouth daily        mirtazapine 30 MG tablet    REMERON     Take 1 tablet by mouth At Bedtime

## 2018-04-23 NOTE — PROGRESS NOTES
Lynne Gutierrez is a 84 year old female being seen today for episodic visit at [facility].    Code Status:  .   Health Care Power of : Extended Emergency Contact Information  Primary Emergency Contact: declined   [place]  Home Phone: [phone]  Relation: None     Allergies: Latex; Lisinopril; Penicillins; and Sulfa drugs     Chief Complaint / HPI: history form staff.  Pt is non verbal.  She comes out for meals, otherwise is in her room or laying in bed most of the time.  Wanders a lot if not laying down.  Will lay in bed, but not sleep.  Does not appear to have pain    Past Medical, Surgical, Family and Social History reviewed: YES.     Medications:       Current Outpatient Prescriptions:      aspirin 81 MG chewable tablet, Take 1 tablet by mouth daily with food, Disp: , Rfl:      brimonidine-timolol (COMBIGAN) 0.2-0.5 % ophthalmic solution, Place 1 drop Into the left eye 2 times daily, Disp: , Rfl:      levothyroxine (SYNTHROID/LEVOTHROID) 50 MCG tablet, Take 1 tablet by mouth every morning (before breakfast), Disp: , Rfl:      magnesium hydroxide (MILK OF MAGNESIA) 400 MG/5ML suspension, Take 30 mLs by mouth daily as needed, Disp: , Rfl:      metoprolol tartrate (LOPRESSOR) 50 MG tablet, Take 12.5 mg by mouth daily, Disp: , Rfl:      mirtazapine (REMERON) 30 MG tablet, Take 1 tablet by mouth At Bedtime, Disp: , Rfl:     Medications - recent changes: no    Review of Systems:  Unable due to advanced dementia  Toileting:    Continent of Bowel: No   Continent of Bladder: No  Mobility: independent     Recent Labs: None    Pertinent Screening Tool results: None    Current Therapies: none    Exam:  Vital signs reviewed. yes  GENERAL APPEARANCE: healthy, alert and no distress  RESP: lungs clear to auscultation - no rales, rhonchi or wheezes  CV: irregular rate and rhythm, normal S1 S2, no S3 or S4, no murmur, click or rub, no peripheral edema and peripheral pulses strong  ABDOMEN: soft,  nontender, no hepatosplenomegaly, no masses and bowel sounds normal  PSYCH: well groomed and judgement and insight impaired, cooperative with exam, but does not follow instructions.    Assessment and Plan:  (G30.1,  F02.80) Late onset Alzheimer's disease without behavioral disturbance  (primary encounter diagnosis)  Comment: end stage  Plan: supportive cares.    French Katz MD on 4/23/2018 at 7:57 AM

## 2018-06-18 ENCOUNTER — NURSING HOME VISIT (OUTPATIENT)
Dept: GERIATRICS | Facility: OTHER | Age: 83
End: 2018-06-18
Payer: COMMERCIAL

## 2018-06-18 VITALS
TEMPERATURE: 95.9 F | HEART RATE: 96 BPM | DIASTOLIC BLOOD PRESSURE: 68 MMHG | RESPIRATION RATE: 18 BRPM | SYSTOLIC BLOOD PRESSURE: 130 MMHG

## 2018-06-18 DIAGNOSIS — E44.1 MILD PROTEIN-CALORIE MALNUTRITION (H): ICD-10-CM

## 2018-06-18 DIAGNOSIS — F02.80 LATE ONSET ALZHEIMER'S DISEASE WITHOUT BEHAVIORAL DISTURBANCE (H): Primary | ICD-10-CM

## 2018-06-18 DIAGNOSIS — I10 ESSENTIAL HYPERTENSION: ICD-10-CM

## 2018-06-18 DIAGNOSIS — I48.20 CHRONIC ATRIAL FIBRILLATION (H): ICD-10-CM

## 2018-06-18 DIAGNOSIS — I63.9 CEREBRAL INFARCTION, UNSPECIFIED MECHANISM (H): ICD-10-CM

## 2018-06-18 DIAGNOSIS — F33.40 RECURRENT MAJOR DEPRESSIVE DISORDER, IN REMISSION (H): ICD-10-CM

## 2018-06-18 DIAGNOSIS — G30.1 LATE ONSET ALZHEIMER'S DISEASE WITHOUT BEHAVIORAL DISTURBANCE (H): Primary | ICD-10-CM

## 2018-06-18 DIAGNOSIS — E03.9 ACQUIRED HYPOTHYROIDISM: ICD-10-CM

## 2018-06-18 PROCEDURE — 99309 SBSQ NF CARE MODERATE MDM 30: CPT | Performed by: NURSE PRACTITIONER

## 2018-06-18 ASSESSMENT — ENCOUNTER SYMPTOMS
SHORTNESS OF BREATH: 0
ACTIVITY CHANGE: 0
PALPITATIONS: 0
APPETITE CHANGE: 0
HEMATOCHEZIA: 0
SLEEP DISTURBANCE: 0
HEARTBURN: 0
VOMITING: 0
TREMORS: 0
EYE REDNESS: 0
CONSTIPATION: 0
NAUSEA: 0
EYE DISCHARGE: 0
JOINT SWELLING: 0
COUGH: 0
SEIZURES: 0
POLYDIPSIA: 0
DYSURIA: 0
DIARRHEA: 0
CONFUSION: 0
WEAKNESS: 0
CHOKING: 0
AGITATION: 0
HEMATURIA: 0
TROUBLE SWALLOWING: 0
ABDOMINAL PAIN: 0
SORE THROAT: 0
DIZZINESS: 0
FEVER: 0
ADENOPATHY: 0
HALLUCINATIONS: 0
POLYPHAGIA: 0
WHEEZING: 0
ARTHRALGIAS: 0
UNEXPECTED WEIGHT CHANGE: 0

## 2018-06-18 NOTE — PROGRESS NOTES
[facility]    Chief Complaint   Patient presents with     FCI Regulatory       HPI:    Lynne Gutierrez is a 85 year old  (5/30/1933), who is being seen today for a federally mandated E/M visit at [facility].  HPI information obtained from: facility staff and resident. Today's concerns are: 60 day recertification      ICD-10-CM    1. Late onset Alzheimer's disease without behavioral disturbance G30.1     F02.80    2. Cerebral infarction, unspecified mechanism (H) I63.9    3. Chronic atrial fibrillation (H) I48.2    4. Essential hypertension I10    5. Acquired hypothyroidism E03.9    6. Mild protein-calorie malnutrition (H) E44.1    7. Recurrent major depressive disorder, in remission (H) F33.40      Patient has Alzheimer dementia and history of CVA.  She currently is not having any problems with behaviors.  She has no depression and is not anticoagulated secondary to her fall risk.  Has hypertension and blood pressure has been well controlled.  She is on Synthroid for hypothyroidism.  Labs were last checked in October 2017.  She has protein malnutrition and is on protein replacement and nutritional shakes.  Her weight is slowly improving.  Also has chronic major depressive disorder which has been stable on Remeron.  Nursing staff report no concerns with her health currently.  Past medical history, past surgical history, social history, allergies and medication list available at [facility] are reviewed today.      Case Management:  Information reviewed:  Medications, vital signs, orders, and nursing notes.    ROS:  Review of Systems   Constitutional: Negative for activity change, appetite change, fever and unexpected weight change.   HENT: Negative for congestion, dental problem, mouth sores, sore throat and trouble swallowing.    Eyes: Negative for discharge and redness.   Respiratory: Negative for cough, choking, shortness of breath and wheezing.    Cardiovascular:  Negative for chest pain, palpitations and peripheral edema.   Gastrointestinal: Negative for abdominal pain, constipation, diarrhea, heartburn, hematochezia, nausea and vomiting.   Endocrine: Negative for cold intolerance, heat intolerance, polydipsia, polyphagia and polyuria.   Genitourinary: Negative for dysuria and hematuria.   Musculoskeletal: Negative for arthralgias and joint swelling.   Skin: Negative for rash.   Neurological: Negative for dizziness, tremors, seizures and weakness.   Hematological: Negative for adenopathy.   Psychiatric/Behavioral: Negative for agitation, behavioral problems, confusion, hallucinations and sleep disturbance.       Exam:  Vitals: /68  Pulse 96  Temp 95.9  F (35.5  C)  Resp 18  BMI= There is no height or weight on file to calculate BMI.  Physical Exam   Constitutional: She appears well-developed. No distress.   HENT:   Mouth/Throat: Oropharynx is clear and moist. No oropharyngeal exudate.   Eyes: Conjunctivae are normal. No scleral icterus.   Neck: Neck supple. No thyromegaly present.   Cardiovascular: Normal rate.  Exam reveals no gallop.    Irregularly irregular   Pulmonary/Chest: Effort normal and breath sounds normal. She has no wheezes. She has no rales.   Abdominal: Soft. She exhibits no distension and no mass. There is no tenderness.   Musculoskeletal: She exhibits edema. She exhibits no tenderness.   Trace bilateral edema   Lymphadenopathy:     She has no cervical adenopathy.   Neurological: She is alert. Coordination normal.   Dementia   Skin: Skin is warm. No rash noted. She is not diaphoretic. No pallor.   Psychiatric:   Dementia, does not answer questions.   Nursing note and vitals reviewed.      Lab/Diagnostic data:     CBC RESULTS:   Recent Labs   Lab Test  02/15/17   0920  12/03/16   0847   HGB  13.0  15.2   HCT  38.3  45.7   MCV  87  88   MCH  29.5  29.5   MCHC  33.8  33.3   RDW  13.2  12.9   PLT  156  176       Last Basic Metabolic Panel:  Recent Labs    Lab Test  02/15/17   0944  12/03/16   0910   NA  141  141   POTASSIUM  4.1  4.3   CHLORIDE  109*  108*   TITI  8.9  10.4*   CO2  26  26   BUN  14  12   CR  0.90  0.95   GLC  79  94       Liver Function Studies -   Recent Labs   Lab Test  10/18/17   1334  02/15/17   0944  12/03/16   0910   PROTTOTAL   --   5.5*  6.9   ALBUMIN  3.5  3.1*  3.8   BILITOTAL   --   0.6  0.7   ALKPHOS   --   48  51       No results found for: TSH]    No results found for: A1C    ASSESSMENT:    ICD-10-CM    1. Late onset Alzheimer's disease without behavioral disturbance G30.1     F02.80    2. Cerebral infarction, unspecified mechanism (H) I63.9    3. Chronic atrial fibrillation (H) I48.2    4. Essential hypertension I10    5. Acquired hypothyroidism E03.9    6. Mild protein-calorie malnutrition (H) E44.1    7. Recurrent major depressive disorder, in remission (H) F33.40        PLAN:  Continue with same medication and treatment plan.  She is not due for lab work currently.  She will be due for thyroid labs in October.  Renew orders in the meantime and follow-up as needed.  Continue with nutritional and protein supplements as weight is improving.  Last albumin and prealbumin were normal.    Electronically signed by:  NATHALIE Ashton   6/18/2018  3:21 PM

## 2018-06-18 NOTE — MR AVS SNAPSHOT
"              After Visit Summary   6/18/2018    Lynne Gutierrez    MRN: [id number]           Patient Information     Date Of Birth          5/30/1933        Visit Information        Provider Department      6/18/2018 3:20 PM Yudith Rubio NP [facility]        Today's Diagnoses     Late onset Alzheimer's disease without behavioral disturbance    -  1    Cerebral infarction, unspecified mechanism (H)        Chronic atrial fibrillation (H)        Essential hypertension        Acquired hypothyroidism        Mild protein-calorie malnutrition (H)        Recurrent major depressive disorder, in remission (H)           Follow-ups after your visit        Who to contact     If you have questions or need follow up information about today's clinic visit or your schedule please contact [facility] directly at [phone].  Normal or non-critical lab and imaging results will be communicated to you by [employer], letter or phone within 4 business days after the clinic has received the results. If you do not hear from us within 7 days, please contact the clinic through [employer] or phone. If you have a critical or abnormal lab result, we will notify you by phone as soon as possible.  Submit refill requests through [employer] or call your pharmacy and they will forward the refill request to us. Please allow 3 business days for your refill to be completed.          Additional Information About Your Visit        [employer] Information     [employer] lets you send messages to your doctor, view your test results, renew your prescriptions, schedule appointments and more. To sign up, go to [email or web address] . Click on \"Log in\" on the left side of the screen, which will take you to the Welcome page. Then click on \"Sign up Now\" on the right side of the page.     You will be asked to enter the access code listed below, as well as some personal information. Please follow the directions to create " your username and password.     Your access code is: [email or web address]  Expires: 2018  3:25 PM     Your access code will  in 90 days. If you need help or a new code, please call your [facility] or [phone].        Care EveryWhere ID     This is your Care EveryWhere ID. This could be used by other organizations to access your [place] medical records  [id number]        Your Vitals Were     Pulse Temperature Respirations             96 95.9  F (35.5  C) 18          Blood Pressure from Last 3 Encounters:   18 130/68   18 115/68   10/14/16 120/72    Weight from Last 3 Encounters:   14 151 lb (68.5 kg)   12 169 lb (76.7 kg)   12 168 lb 3.2 oz (76.3 kg)              Today, you had the following     No orders found for display       Primary Care Provider Office Phone # Fax #    French Katz [phone]       [address] [facility] [address]        Equal Access to Services     [facility]: Hadii aad ku hadleandroo Soidalia, waaxda luqadaha, qaybta kaalmanicko chavez, chela manning . So [facility] [phone].    ATENCIÓN: Si habla español, tiene a baez disposición servicios gratuitos de asistencia lingüística. [facility] [phone].    We comply with applicable federal civil rights laws and [place] laws. We do not discriminate on the basis of race, color, national origin, age, disability, sex, sexual orientation, or gender identity.            Thank you!     Thank you for choosing [facility] AND [facility]  for your care. Our goal is always to provide you with excellent care. Hearing back from our patients is one way we can continue to improve our services. Please take a few minutes to complete the written survey that you may receive in the mail after your visit with us. Thank you!             Your Updated Medication List - Protect others around you: Learn how to safely use, store and throw away your medicines at  www.disposemymeds.org.          This list is accurate as of 6/18/18  3:25 PM.  Always use your most recent med list.                   Brand Name Dispense Instructions for use Diagnosis    aspirin 81 MG chewable tablet      Take 1 tablet by mouth daily with food        brimonidine-timolol 0.2-0.5 % ophthalmic solution    COMBIGAN     Place 1 drop Into the left eye 2 times daily        levothyroxine 50 MCG tablet    SYNTHROID/LEVOTHROID     Take 1 tablet by mouth every morning (before breakfast)        magnesium hydroxide 400 MG/5ML suspension    MILK OF MAGNESIA     Take 30 mLs by mouth daily as needed        metoprolol tartrate 50 MG tablet    LOPRESSOR     Take 12.5 mg by mouth daily        mirtazapine 30 MG tablet    REMERON     Take 1 tablet by mouth At Bedtime

## 2018-07-23 NOTE — PROGRESS NOTES
Patient Information     Patient Name  Lynne Gutierrez MRN  [id number] Sex  Female   1933      Letter by French Katz MD at      Author:  French Katz MD Service:  (none) Author Type:  (none)    Filed:   Encounter Date:  10/18/2017 Status:  (Other)           Lynne Gutierrez  [address] [facility] [address]          2017    Dear Ms. Gutierrez:    Following are the tests completed during your last clinic visit.  The results of these tests are normal and require no further attention unless otherwise noted.    Results for orders placed or performed in visit on 10/18/17      TSH      Result  Value Ref Range    TSH 4.31 0.34 - 5.60 uIU/mL   T4,FREE      Result  Value Ref Range    T4,FREE 0.82 0.58 - 1.64 ng/dL         If you have any further questions or problems contact my office at  [phone].    Thank you,    French Katz MD

## 2018-09-20 ENCOUNTER — NURSING HOME VISIT (OUTPATIENT)
Dept: GERIATRICS | Facility: OTHER | Age: 83
End: 2018-09-20
Attending: FAMILY MEDICINE
Payer: COMMERCIAL

## 2018-09-20 DIAGNOSIS — F02.80 LATE ONSET ALZHEIMER'S DISEASE WITHOUT BEHAVIORAL DISTURBANCE (H): Primary | ICD-10-CM

## 2018-09-20 DIAGNOSIS — G30.1 LATE ONSET ALZHEIMER'S DISEASE WITHOUT BEHAVIORAL DISTURBANCE (H): Primary | ICD-10-CM

## 2018-09-20 PROCEDURE — 99308 SBSQ NF CARE LOW MDM 20: CPT | Performed by: FAMILY MEDICINE

## 2018-09-20 NOTE — MR AVS SNAPSHOT
After Visit Summary   9/20/2018    Lynne Gutierrez    MRN: [id number]           Patient Information     Date Of Birth          5/30/1933        Visit Information        Provider Department      9/20/2018 3:30 AM French Katz MD [facility]        Today's Diagnoses     Late onset Alzheimer's disease without behavioral disturbance    -  1       Follow-ups after your visit        Who to contact     If you have questions or need follow up information about today's clinic visit or your schedule please contact [facility] directly at [phone].  Normal or non-critical lab and imaging results will be communicated to you by MyChart, letter or phone within 4 business days after the clinic has received the results. If you do not hear from us within 7 days, please contact the clinic through MyChart or phone. If you have a critical or abnormal lab result, we will notify you by phone as soon as possible.  Submit refill requests through [employer] or call your pharmacy and they will forward the refill request to us. Please allow 3 business days for your refill to be completed.          Additional Information About Your Visit        Care EveryWhere ID     This is your Care EveryWhere ID. This could be used by other organizations to access your [facility] medical records  [id number]         Blood Pressure from Last 3 Encounters:   06/18/18 130/68   02/19/18 115/68   10/14/16 120/72    Weight from Last 3 Encounters:   03/27/14 151 lb (68.5 kg)   09/27/12 169 lb (76.7 kg)   09/14/12 168 lb 3.2 oz (76.3 kg)              Today, you had the following     No orders found for display       Primary Care Provider Office Phone # Fax #    French Katz [phone]       [address] [facility] [address]        Equal Access to Services     JOSUE RILEY AH: Sera Alegre, aaliyah barkley, chela torrez  celestine manning ah. So [facility] [phone].    ATENCIÓN: Si don zheng, tiene a baez disposición servicios gratuitos de asistencia lingüística. Damion cooper [phone].    We comply with applicable federal civil rights laws and [place] laws. We do not discriminate on the basis of race, color, national origin, age, disability, sex, sexual orientation, or gender identity.            Thank you!     Thank you for choosing [facility] AND [facility]  for your care. Our goal is always to provide you with excellent care. Hearing back from our patients is one way we can continue to improve our services. Please take a few minutes to complete the written survey that you may receive in the mail after your visit with us. Thank you!             Your Updated Medication List - Protect others around you: Learn how to safely use, store and throw away your medicines at www.disposemymeds.org.          This list is accurate as of 9/20/18 11:59 PM.  Always use your most recent med list.                   Brand Name Dispense Instructions for use Diagnosis    aspirin 81 MG chewable tablet      Take 1 tablet by mouth daily with food        brimonidine-timolol 0.2-0.5 % ophthalmic solution    COMBIGAN     Place 1 drop Into the left eye 2 times daily        levothyroxine 50 MCG tablet    SYNTHROID/LEVOTHROID     Take 1 tablet by mouth every morning (before breakfast)        magnesium hydroxide 400 MG/5ML suspension    MILK OF MAGNESIA     Take 30 mLs by mouth daily as needed        metoprolol tartrate 50 MG tablet    LOPRESSOR     Take 12.5 mg by mouth daily        mirtazapine 30 MG tablet    REMERON     Take 1 tablet by mouth At Bedtime

## 2018-09-25 NOTE — PROGRESS NOTES
Lynne Gutierrez is a 85 year old female being seen today for regulatory visit at [facility].    Code Status: DNR / DNI.   Health Care Power of : Extended Emergency Contact Information  Primary Emergency Contact: declined   [facility]  Home Phone: [phone]  Relation: None     Allergies: Latex; Lisinopril; Penicillins; and Sulfa drugs     Chief Complaint / HPI: follow up on severe dementia.  The staff has no concerns at all, she is nearly non verbal and spends lots of time now just sleeping.  No obvious pain or behavior problems.    Past Medical, Surgical, Family and Social History reviewed: YES.     Medications: reviewed  Medications - recent changes: no    Review of Systems:  Unable due to dementia  Toileting:    Continent of Bowel: No   Continent of Bladder: No  Mobility:      Recent Labs: None    Pertinent Screening Tool results: None    Current Therapies: none    Exam:  Vital signs reviewed. yes  GENERAL APPEARANCE: healthy, alert and no distress, laying in bed, arousable to voice.  RESP: lungs clear to auscultation - no rales, rhonchi or wheezes  CV: regular rate and rhythm, normal S1 S2, no S3 or S4, no murmur, click or rub, no peripheral edema and peripheral pulses strong  PSYCH: mentation appears normal and affect normal/bright  PSYCH: mentation appears normal, affect normal/bright and cooperative with exam, but completely non verbal.    Assessment and Plan:  (G30.1,  F02.80) Late onset Alzheimer's disease without behavioral disturbance  (primary encounter diagnosis)  Comment: stable  Plan: supportive cares.

## 2018-11-15 ENCOUNTER — NURSING HOME VISIT (OUTPATIENT)
Dept: GERIATRICS | Facility: OTHER | Age: 83
End: 2018-11-15
Attending: NURSE PRACTITIONER
Payer: COMMERCIAL

## 2018-11-15 ENCOUNTER — NURSING HOME DICTATION (OUTPATIENT)
Dept: INTERNAL MEDICINE | Facility: OTHER | Age: 83
End: 2018-11-15

## 2018-11-15 DIAGNOSIS — F02.80 LATE ONSET ALZHEIMER'S DISEASE WITHOUT BEHAVIORAL DISTURBANCE (H): Primary | ICD-10-CM

## 2018-11-15 DIAGNOSIS — G30.1 LATE ONSET ALZHEIMER'S DISEASE WITHOUT BEHAVIORAL DISTURBANCE (H): Primary | ICD-10-CM

## 2018-11-15 PROCEDURE — 99309 SBSQ NF CARE MODERATE MDM 30: CPT | Performed by: NURSE PRACTITIONER

## 2018-11-16 NOTE — PROGRESS NOTES
Visit Date:   11/15/2018      CHIEF COMPLAINT:  60-day recertification.      HISTORY OF PRESENT ILLNESS:  Lynne has been stable.  Her weight is now stable.  She is on ProSource protein supplement twice daily, nutritional shake 3 times daily, and Ensure twice daily.  Her weight was dropping until the dietary interventions.  She has history of severe dementia.  She does not have any coughing or choking with meals.  No aspiration pneumonia.  Vital signs currently are stable.  She has hypothyroidism with labs completed a couple of months ago that were within normal range.  Her albumin and prealbumin levels are now stable.  She has hypertension that is well controlled on medication.  Nursing staff reports overall she is doing well.  Patient has atrial fibrillation which is controlled with beta blocker.  She is not on anticoagulants due to risk.  She also has depression, which is stable on Remeron.        PAST MEDICAL HISTORY, PAST SURGICAL HISTORY, ALLERGIES, MEDICATION, RISK FACTORS AND SOCIAL HISTORY:  Reviewed.      REVIEW OF SYSTEMS:  A 12-point complete review of systems was discussed with nursing staff.  Resident's dementia prohibits her from giving details.  See HPI for positive findings, otherwise unremarkable.      PHYSICAL EXAMINATION:   VITAL SIGNS:  Blood pressure 128/71, pulse 66, respiratory rate 18, temperature 95.7, SpO2 99% on room air.     GENERAL:  Pleasantly demented female lying in bed in no acute distress.  She is smiling and awakens easily.  She is nonverbal.     HEENT:  Sclerae nonicteric.  Conjunctivae not inflamed.  Mucous membranes moist.   NECK:  Supple and without adenopathy.   LUNGS:  Feng clear to auscultation throughout.   CARDIOVASCULAR:  Regular rate and rhythm.   ABDOMEN:  Soft and without masses, tenderness, or organomegaly.   EXTREMITIES:  Without edema.  Exposed skin without rashes.        Labs reviewed and discussed in HPI.      ASSESSMENT:   1.  Alzheimer's dementia.   2.   Atrial fibrillation.   3.  Hypertension.   4.  Hypothyroidism.   5.  Major depressive disorder.      PLAN:  Overall, she is doing quite well.  We will continue her on the nutritional supplements, as her weight and protein levels are now stable.  Continue with same medication and treatment plan.  She is not due for any blood work.         DEYVI LUI NP             D: 11/15/2018   T: 11/15/2018   MT: DANICA      Name:     MINI DOMINGUEZ   MRN:      [id number]        Account:      [id number]   :      1933           Visit Date:   11/15/2018      Document: [id number]       cc: Sonam Katz MD

## 2018-11-28 ENCOUNTER — MEDICAL CORRESPONDENCE (OUTPATIENT)
Dept: HEALTH INFORMATION MANAGEMENT | Facility: OTHER | Age: 83
End: 2018-11-28

## 2018-11-28 ENCOUNTER — RESULTS ONLY (OUTPATIENT)
Dept: LAB | Age: 83
End: 2018-11-28

## 2018-11-28 LAB — TSH SERPL DL<=0.05 MIU/L-ACNC: 2.37 IU/ML (ref 0.34–5.6)

## 2019-01-09 ENCOUNTER — NURSING HOME VISIT (OUTPATIENT)
Dept: GERIATRICS | Facility: OTHER | Age: 84
End: 2019-01-09
Attending: FAMILY MEDICINE
Payer: COMMERCIAL

## 2019-01-09 DIAGNOSIS — G30.1 LATE ONSET ALZHEIMER'S DISEASE WITHOUT BEHAVIORAL DISTURBANCE (H): Primary | ICD-10-CM

## 2019-01-09 DIAGNOSIS — F02.80 LATE ONSET ALZHEIMER'S DISEASE WITHOUT BEHAVIORAL DISTURBANCE (H): Primary | ICD-10-CM

## 2019-01-09 PROCEDURE — 99308 SBSQ NF CARE LOW MDM 20: CPT | Performed by: FAMILY MEDICINE

## 2019-01-11 NOTE — PROGRESS NOTES
Lynne Gutierrez is a 85 year old female being seen today for episodic visit at ,[facility].    Code Status: DNR / DNI.   Health Care Power of : Extended Emergency Contact Information  Primary Emergency Contact: declined   [facility]  Home Phone: [phone]  Relation: None     Allergies: Latex; Lisinopril; Penicillins; and Sulfa drugs     Chief Complaint / HPI: Staff has no concerns.  Pt is ambulatory, very rarely speaks.  Seems to not have pain/    Past Medical, Surgical, Family and Social History reviewed: YES.     Medications: reviewed  Medications - recent changes: no    Review of Systems:  unable    Mobility: independent     Recent Labs: None    Pertinent Screening Tool results: None    Current Therapies: none    Exam:  Vital signs reviewed. 160/80, 97%, 58, 131#, stable  GENERAL APPEARANCE: healthy, alert and no distress  RESP: lungs clear to auscultation - no rales, rhonchi or wheezes  CV: regular rate and rhythm, normal S1 S2, no S3 or S4, no murmur, click or rub, no peripheral edema and peripheral pulses strong  ABDOMEN: soft, nontender, no hepatosplenomegaly, no masses and bowel sounds normal  NEURO: no distress, looks at me when I talk to her, but vocalizes only in a moaning sort of way.    Assessment and Plan:  (G30.1,  F02.80) Late onset Alzheimer's disease without behavioral disturbance  (primary encounter diagnosis)  Comment: end stage  Plan: supportive cares    French Katz MD on 1/11/2019 at 7:13 AM

## 2019-02-08 ENCOUNTER — DOCUMENTATION ONLY (OUTPATIENT)
Dept: OTHER | Facility: CLINIC | Age: 84
End: 2019-02-08

## 2019-03-04 ENCOUNTER — NURSING HOME VISIT (OUTPATIENT)
Dept: GERIATRICS | Facility: OTHER | Age: 84
End: 2019-03-04
Attending: NURSE PRACTITIONER
Payer: COMMERCIAL

## 2019-03-04 ENCOUNTER — NURSING HOME DICTATION (OUTPATIENT)
Dept: INTERNAL MEDICINE | Facility: OTHER | Age: 84
End: 2019-03-04

## 2019-03-04 DIAGNOSIS — F02.80 LATE ONSET ALZHEIMER'S DISEASE WITHOUT BEHAVIORAL DISTURBANCE (H): Primary | ICD-10-CM

## 2019-03-04 DIAGNOSIS — G30.1 LATE ONSET ALZHEIMER'S DISEASE WITHOUT BEHAVIORAL DISTURBANCE (H): Primary | ICD-10-CM

## 2019-03-04 PROCEDURE — 99310 SBSQ NF CARE HIGH MDM 45: CPT | Performed by: NURSE PRACTITIONER

## 2019-03-05 NOTE — PROGRESS NOTES
Visit Date:   03/04/2019      CHIEF COMPLAINT:  A 60-day recertification.        HISTORY OF PRESENT ILLNESS:  Lynne had been losing weight.  She is on nutritional supplements now.  She is on a house supplement 3 times daily, Ensure 1 can twice daily and also ProSource which is a protein supplement 1 ounce daily.  When protein levels were last checked, they were back within normal range.  Her weight is improving.  Her weight is actually up 7 pounds since December.  She has hypertension and her blood pressures have been elevated since 02/01 and ranged from 151//86.  She is on Toprol-XL 12.5 mg daily.  Her pulse ranges from 62-77.      SUBJECTIVE:  Staff reports she has not had any aggressive behaviors recently.  She does have hypothyroidism and is on replacement therapy.  Last thyroid level was in November and that was within normal range.      PAST MEDICAL HISTORY, PAST SURGICAL HISTORY, ALLERGIES, MEDICATIONS, RISK FACTORS, AND SOCIAL HISTORY:  All reviewed.        REVIEW OF SYSTEMS:  A 12-point complete review of systems:  Discussed with nursing staff, resident has Alzheimer disease and unable to get history.        PHYSICAL EXAMINATION:   VITAL SIGNS:  Blood pressure 156/86, SpO2 of 96% on room air, pulse 77, respiratory rate 17, temperature 98.6, weight 136 pounds.     GENERAL:  The patient came out of her room and started walking.  She was assisted by the nursing assistant.  This was not typical behavior of her, as she usually will stay in her room until staff comes to get her.     HEENT:  Skin color pink.  Sclerae nonicteric.  Mucous membranes moist.   CARDIOVASCULAR:  Regular rate and rhythm.  At this time, the patient pushed me away and became frustrated and wanted to continue walking.  She walked with a nursing assistant without frustration or aggressive behavior.      ASSESSMENT:   1.  Alzheimer dementia.   2.  Hypothyroidism.   3.  Weight loss.   4.  Protein malnutrition.   5.  Hypertension.         ADVANCE DIRECTIVES:  Do not resuscitate.      PLAN:  Recent protein levels have been normal.  Discontinue the ProSource.  Would recommend to consider simplifying her nutritional supplement drinks, as she is on both Ensure and the house nutritional supplement.  They can discuss this with dietitian.  Her weight has also improved.  Therefore, one of these likely could be placed on hold.  Her blood pressure was running high, so will increase Toprol-XL to 25 mg daily.  She did have some behavior at this visit and that is atypical of resident.  If this continues, then the new behaviors may need to be evaluated and worked up.         DEYVI LUI NP             D: 2019   T: 2019   MT: AVERY      Name:     MINI DOMINGUEZ   MRN:      [id number]        Account:      [id number]   :      1933           Visit Date:   2019      Document: [id number]       cc: [facility]        French Katz MD

## 2019-05-22 ENCOUNTER — NURSING HOME VISIT (OUTPATIENT)
Dept: GERIATRICS | Facility: OTHER | Age: 84
End: 2019-05-22
Attending: FAMILY MEDICINE
Payer: COMMERCIAL

## 2019-05-22 DIAGNOSIS — G30.1 LATE ONSET ALZHEIMER'S DISEASE WITHOUT BEHAVIORAL DISTURBANCE (H): Primary | ICD-10-CM

## 2019-05-22 DIAGNOSIS — F02.80 LATE ONSET ALZHEIMER'S DISEASE WITHOUT BEHAVIORAL DISTURBANCE (H): Primary | ICD-10-CM

## 2019-05-22 PROCEDURE — 99308 SBSQ NF CARE LOW MDM 20: CPT | Performed by: FAMILY MEDICINE

## 2019-05-24 NOTE — PROGRESS NOTES
Lynne Gutierrez is a 85 year old female being seen today for regulatory visit at [facility].    Code Status:  .   Health Care Power of : Extended Emergency Contact Information  Primary Emergency Contact: Ethan Gutierrez   [facility]  Home Phone: [phone]  Relation: Spouse  Secondary Emergency Contact: Chris Gutierrez  Relation: Son     Allergies: Latex; Lisinopril; Penicillins; and Sulfa drugs     Chief Complaint / HPI: Pt unable to give a history.  Per nursing, no concerns.    Past Medical, Surgical, Family and Social History reviewed: YES.     Medications: reviewed  Medications - recent changes:  no    Review of Systems:  unable  Mobility: wheelchair    Recent Labs: None    Pertinent Screening Tool results: None    Current Therapies: none    Exam:  Vital signs reviewed. 137#, 128/58, 62 bpm  GENERAL APPEARANCE: healthy, alert, no distress and non verbal.  Was sleeping and arouses to voice, but cannot talk to me.  RESP: lungs clear to auscultation - no rales, rhonchi or wheezes  CV: regular rate and rhythm, normal S1 S2, no S3 or S4, no murmur, click or rub, no peripheral edema and peripheral pulses strong    Assessment and Plan:  (G30.1,  F02.80) Late onset Alzheimer's disease without behavioral disturbance  (primary encounter diagnosis)  Comment: end stage  Plan: supportive cares.

## 2019-07-22 ENCOUNTER — NURSING HOME DICTATION (OUTPATIENT)
Dept: INTERNAL MEDICINE | Facility: OTHER | Age: 84
End: 2019-07-22

## 2019-07-22 ENCOUNTER — NURSING HOME VISIT (OUTPATIENT)
Dept: GERIATRICS | Facility: OTHER | Age: 84
End: 2019-07-22
Attending: NURSE PRACTITIONER
Payer: COMMERCIAL

## 2019-07-22 DIAGNOSIS — F02.80 LATE ONSET ALZHEIMER'S DISEASE WITHOUT BEHAVIORAL DISTURBANCE (H): Primary | ICD-10-CM

## 2019-07-22 DIAGNOSIS — G30.1 LATE ONSET ALZHEIMER'S DISEASE WITHOUT BEHAVIORAL DISTURBANCE (H): Primary | ICD-10-CM

## 2019-07-22 PROCEDURE — 99309 SBSQ NF CARE MODERATE MDM 30: CPT | Performed by: NURSE PRACTITIONER

## 2019-07-23 NOTE — PROGRESS NOTES
Visit Date:   2019      CHIEF COMPLAINT:  60-day recertification.      HISTORY OF PRESENT ILLNESS:  Patient has severe dementia and major depressive disorder.  She is on Remeron.  She is not having any problems with behaviors.  Her weight is down 4 pounds since April.  Staff reports that she eats usually run half of her meals.  She has had problems with weight issues in the past.  She does have Ensure added to her meals twice daily.  She also has known hypothyroidism with last TSH in 2008 and besides the weight loss, no symptoms suggestive of thyroid dysfunction.  She has hypertension which is well controlled on metoprolol.      PAST MEDICAL HISTORY, PAST SURGICAL HISTORY, ALLERGIES, MEDICATIONS, RISK FACTORS, SOCIAL HISTORY:  All reviewed.      REVIEW OF SYSTEMS:  discussed with nursing staff and including 12-point review of systems; however, unable to obtain from resident due to dementia.      PHYSICAL EXAMINATION:   VITAL SIGNS:  Blood pressure 125/64, pulse 60, respiratory rate 17, temperature 97.7, SpO2 96% on room air.   GENERAL:  Pleasant female in no acute distress.  Skin color pink.  Mucous membranes moist.  She is nonverbal.   NECK:  Supple without adenopathy.  No thyromegaly.  No thyroid tenderness.   LUNGS:  Fields clear to auscultation throughout.   CARDIOVASCULAR:  Regular rate and rhythm.   ABDOMEN:  Soft, without masses, tenderness and organomegaly.   EXTREMITIES:  Without edema.      ASSESSMENT:   1.  Dementia.   2.  Major depressive disorder.   3.  Hypothyroidism.   4.  Hypertension.   5.  Weight loss.      PLAN:  We will just renew orders.  If the weight loss continues, then we will consider thyroid labs, otherwise recommend increasing Ensure to 1 can 3 times daily with meals.         DEYVI LUI NP             D: 2019   T: 2019   MT: VALENTE      Name:     MINI DOMINGUEZ   MRN:      [id number]        Account:      [id number]   :      1933           Visit  Date:   07/22/2019      Document: [id number]       cc: [facility]        French Katz MD

## 2019-09-18 ENCOUNTER — NURSING HOME VISIT (OUTPATIENT)
Dept: GERIATRICS | Facility: OTHER | Age: 84
End: 2019-09-18
Attending: FAMILY MEDICINE
Payer: COMMERCIAL

## 2019-09-18 DIAGNOSIS — G30.1 LATE ONSET ALZHEIMER'S DISEASE WITHOUT BEHAVIORAL DISTURBANCE (H): Primary | ICD-10-CM

## 2019-09-18 DIAGNOSIS — F02.80 LATE ONSET ALZHEIMER'S DISEASE WITHOUT BEHAVIORAL DISTURBANCE (H): Primary | ICD-10-CM

## 2019-09-18 PROCEDURE — 99308 SBSQ NF CARE LOW MDM 20: CPT | Performed by: FAMILY MEDICINE

## 2019-09-20 NOTE — PROGRESS NOTES
Lynne Gutierrez is a 86 year old female being seen today for regulatory visit at [facility].    Code Status: DNR / DNI.   Health Care Power of : Extended Emergency Contact Information  Primary Emergency Contact: Ethan Gutierrez   [facility]  Home Phone: [phone]  Relation: Spouse  Secondary Emergency Contact: Chris Gutierrez  Relation: Son     Allergies: Latex; Lisinopril; Penicillins; and Sulfa drugs     Chief Complaint / HPI: pt gives no history, spoke with nursing and she is stable.  Spends most of the day in bed.    Past Medical, Surgical, Family and Social History reviewed: YES.     Medications: reviewed  Medications - recent changes: no    Review of Systems:  unable  Toileting:    Continent of Bowel:     Continent of Bladder: No  Mobility: walks independently at times    Recent Labs: due for TSH in 3 months    Pertinent Screening Tool results: None    Current Therapies: none    Exam:  Vital signs reviewed. yes  GENERAL APPEARANCE: healthy, alert and no distress  GENERAL APPEARANCE: elderly, frail and sleeping when I enter the room.  Wakes to voice, smiles at me but does not talk at all  RESP: lungs clear to auscultation - no rales, rhonchi or wheezes  CV: regular rate and rhythm, normal S1 S2, no S3 or S4, no murmur, click or rub, no peripheral edema and peripheral pulses strong  ABDOMEN: soft, nontender, no hepatosplenomegaly, no masses and bowel sounds normal    Assessment and Plan:  (G30.1,  F02.80) Late onset Alzheimer's disease without behavioral disturbance  (primary encounter diagnosis)  Comment: slowly progressing as expected  Plan: supportive cares.

## 2019-10-09 ENCOUNTER — HOSPITAL ENCOUNTER (EMERGENCY)
Facility: OTHER | Age: 84
Discharge: HOME | End: 2019-10-09
Attending: FAMILY MEDICINE | Admitting: FAMILY MEDICINE
Payer: COMMERCIAL

## 2019-10-09 VITALS
TEMPERATURE: 96 F | OXYGEN SATURATION: 95 % | SYSTOLIC BLOOD PRESSURE: 142 MMHG | HEART RATE: 50 BPM | RESPIRATION RATE: 16 BRPM | DIASTOLIC BLOOD PRESSURE: 68 MMHG | WEIGHT: 130.6 LBS

## 2019-10-09 DIAGNOSIS — R00.1 BRADYCARDIA: ICD-10-CM

## 2019-10-09 LAB
ALBUMIN SERPL-MCNC: 3.7 G/DL (ref 3.5–5.7)
ALP SERPL-CCNC: 269 U/L (ref 34–104)
ALT SERPL W P-5'-P-CCNC: 114 U/L (ref 7–52)
ANION GAP SERPL CALCULATED.3IONS-SCNC: 8 MMOL/L (ref 3–14)
AST SERPL W P-5'-P-CCNC: 196 U/L (ref 13–39)
BASOPHILS # BLD AUTO: 0 10E9/L (ref 0–0.2)
BASOPHILS NFR BLD AUTO: 0.5 %
BILIRUB SERPL-MCNC: 2 MG/DL (ref 0.3–1)
BUN SERPL-MCNC: 15 MG/DL (ref 7–25)
CALCIUM SERPL-MCNC: 9.4 MG/DL (ref 8.6–10.3)
CHLORIDE SERPL-SCNC: 105 MMOL/L (ref 98–107)
CO2 SERPL-SCNC: 28 MMOL/L (ref 21–31)
CREAT SERPL-MCNC: 1.05 MG/DL (ref 0.6–1.2)
DIFFERENTIAL METHOD BLD: ABNORMAL
EOSINOPHIL # BLD AUTO: 0.1 10E9/L (ref 0–0.7)
EOSINOPHIL NFR BLD AUTO: 1.1 %
ERYTHROCYTE [DISTWIDTH] IN BLOOD BY AUTOMATED COUNT: 13.4 % (ref 10–15)
GFR SERPL CREATININE-BSD FRML MDRD: 50 ML/MIN/{1.73_M2}
GLUCOSE SERPL-MCNC: 139 MG/DL (ref 70–105)
HCT VFR BLD AUTO: 42.7 % (ref 35–47)
HGB BLD-MCNC: 14.2 G/DL (ref 11.7–15.7)
IMM GRANULOCYTES # BLD: 0 10E9/L (ref 0–0.4)
IMM GRANULOCYTES NFR BLD: 0 %
LYMPHOCYTES # BLD AUTO: 0.9 10E9/L (ref 0.8–5.3)
LYMPHOCYTES NFR BLD AUTO: 21 %
MCH RBC QN AUTO: 30.5 PG (ref 26.5–33)
MCHC RBC AUTO-ENTMCNC: 33.3 G/DL (ref 31.5–36.5)
MCV RBC AUTO: 92 FL (ref 78–100)
MONOCYTES # BLD AUTO: 0.2 10E9/L (ref 0–1.3)
MONOCYTES NFR BLD AUTO: 5.3 %
NEUTROPHILS # BLD AUTO: 3.2 10E9/L (ref 1.6–8.3)
NEUTROPHILS NFR BLD AUTO: 72.1 %
PLATELET # BLD AUTO: 134 10E9/L (ref 150–450)
POTASSIUM SERPL-SCNC: 4.2 MMOL/L (ref 3.5–5.1)
PROT SERPL-MCNC: 6.9 G/DL (ref 6.4–8.9)
RBC # BLD AUTO: 4.66 10E12/L (ref 3.8–5.2)
SODIUM SERPL-SCNC: 141 MMOL/L (ref 134–144)
WBC # BLD AUTO: 4.4 10E9/L (ref 4–11)

## 2019-10-09 PROCEDURE — 36415 COLL VENOUS BLD VENIPUNCTURE: CPT | Performed by: FAMILY MEDICINE

## 2019-10-09 PROCEDURE — 93005 ELECTROCARDIOGRAM TRACING: CPT | Performed by: FAMILY MEDICINE

## 2019-10-09 PROCEDURE — 99284 EMERGENCY DEPT VISIT MOD MDM: CPT | Performed by: FAMILY MEDICINE

## 2019-10-09 PROCEDURE — 80053 COMPREHEN METABOLIC PANEL: CPT | Performed by: FAMILY MEDICINE

## 2019-10-09 PROCEDURE — 99283 EMERGENCY DEPT VISIT LOW MDM: CPT | Mod: Z6 | Performed by: FAMILY MEDICINE

## 2019-10-09 PROCEDURE — 85025 COMPLETE CBC W/AUTO DIFF WBC: CPT | Performed by: FAMILY MEDICINE

## 2019-10-09 NOTE — ED NOTES
Charge nurse contacts NH for transport as no  is available and pt is not safe for cab.  NH states they will contact family to transport.

## 2019-10-09 NOTE — ED AVS SNAPSHOT
[facility]  [address]  [place] [address]  Phone:  [phone]  Fax:  [phone]                                    Lynne Gutierrez   MRN: [id number]    Department:  [facility]   Date of Visit:  10/9/2019           After Visit Summary Signature Page    I have received my discharge instructions, and my questions have been answered. I have discussed any challenges I see with this plan with the nurse or doctor.    ..........................................................................................................................................  Patient/Patient Representative Signature      ..........................................................................................................................................  Patient Representative Print Name and Relationship to Patient    ..................................................               ................................................  Date                                   Time    ..........................................................................................................................................  Reviewed by Signature/Title    ...................................................              ..............................................  Date                                               Time          22EPIC Rev 08/18

## 2019-10-09 NOTE — ED PROVIDER NOTES
History     Chief Complaint   Patient presents with     Bradycardia     HPI  Lynne Gutierrez is a 86 year old female who comes into the emergency department for an episode of bradycardia for which EMS gave atropine and her heart rate and blood pressure improved.  She is comfort care only from the [place].  She had no change in mental status or apparent change in level of distress during this episode.  No history able to be obtained from the patient, from nurse's notes and from EMS.  Patient comes in for bradycardia, she has history of severe dementia and is comfort cares.  She received atropine in the field with good improvement.  Allergies:  Allergies   Allergen Reactions     Latex Unknown     Other reaction(s): *Unknown     Lisinopril Unknown     Other reaction(s): *Unknown     Penicillins Unknown     Other reaction(s): *Unknown     Sulfa Drugs Unknown     Other reaction(s): *Unknown       Problem List:    Patient Active Problem List    Diagnosis Date Noted     Mild protein-calorie malnutrition (H) 02/19/2018     Priority: Medium     Recurrent major depressive disorder, in remission (H) 02/19/2018     Priority: Medium     Transient cerebral ischemia 12/23/2016     Priority: Medium     Acquired hypothyroidism 04/04/2016     Priority: Medium     Alzheimer's disease (H) 03/29/2013     Priority: Medium     Atrial fibrillation (H) 12/28/2012     Priority: Medium     Cerebral infarction (H) 12/02/2012     Priority: Medium     Overview:   Acute Infarct, posterior left parietal region       Hypertension 12/02/2012     Priority: Medium     Overview:   Acute on chronic          Past Medical History:    Past Medical History:   Diagnosis Date     Agoraphobia      Anxiety disorder      Atrial fibrillation (H)      Autoimmune thyroiditis      Bursopathy      Carcinoma in situ      Cerebral infarction (H)      Chronic kidney disease, stage III (moderate) (H)      Diffuse cystic mastopathy of breast      Disorder of  cartilage      Essential (primary) hypertension      Ganglion      Hypothyroidism      Noninfective gastroenteritis and colitis      Noninfective gastroenteritis and colitis      Obesity      Pneumonitis due to inhalation of food and vomit (H)      Pure hypercholesterolemia      Synovial cyst of popliteal space        Past Surgical History:    Past Surgical History:   Procedure Laterality Date     ARTHROSCOPY KNEE      09/04,Status post left knee arthroscopy     BIOPSY BREAST      Breast biopsy x 2, both benign     COLONOSCOPY      1999     EXTRACAPSULAR CATARACT EXTRATION WITH INTRAOCULAR LENS IMPLANT      2009,Bilateral cataract surgery     LAPAROSCOPIC TUBAL LIGATION      No Comments Provided     RELEASE CARPAL TUNNEL      2011,Right carpal tunnel release and trigger fingers x 2       Family History:    Family History   Problem Relation Age of Onset     Heart Disease Father         Heart Disease     Cancer Sister         Cancer,head/neck     Heart Disease Brother         Heart Disease     Other - See Comments Sister         Irregular heart rhythm     Heart Disease Sister         Heart Disease     Other - See Comments Sister         MVA     Other - See Comments Brother         Multple Sclerosis     Family History Negative Brother         Good Health       Social History:  Marital Status:   [2]  Social History     Tobacco Use     Smoking status: Never Smoker     Smokeless tobacco: Never Used   Substance Use Topics     Alcohol use: No     Drug use: Unknown     Types: Other     Comment: Drug use: No        Medications:    aspirin 81 MG chewable tablet  brimonidine-timolol (COMBIGAN) 0.2-0.5 % ophthalmic solution  levothyroxine (SYNTHROID/LEVOTHROID) 50 MCG tablet  magnesium hydroxide (MILK OF MAGNESIA) 400 MG/5ML suspension  metoprolol tartrate (LOPRESSOR) 50 MG tablet  mirtazapine (REMERON) 30 MG tablet          Review of Systems   Unable to perform ROS: Dementia       Physical Exam   BP: 93/83  Pulse:  56  Heart Rate: 57  Temp: 96  F (35.6  C)  Resp: 20  Weight: 59.2 kg (130 lb 9.6 oz)  SpO2: 100 %      Physical Exam  Neck:      Musculoskeletal: Normal range of motion.   Cardiovascular:      Rate and Rhythm: Bradycardia present.   Pulmonary:      Effort: No respiratory distress.   Neurological:      Mental Status: She is alert.         ED Course        Procedures   EKG: Bradycardia  Results for orders placed or performed during the hospital encounter of 10/09/19 (from the past 24 hour(s))   CBC with platelets differential   Result Value Ref Range    WBC 4.4 4.0 - 11.0 10e9/L    RBC Count 4.66 3.8 - 5.2 10e12/L    Hemoglobin 14.2 11.7 - 15.7 g/dL    Hematocrit 42.7 35.0 - 47.0 %    MCV 92 78 - 100 fl    MCH 30.5 26.5 - 33.0 pg    MCHC 33.3 31.5 - 36.5 g/dL    RDW 13.4 10.0 - 15.0 %    Platelet Count 134 (L) 150 - 450 10e9/L    Diff Method Automated Method     % Neutrophils 72.1 %    % Lymphocytes 21.0 %    % Monocytes 5.3 %    % Eosinophils 1.1 %    % Basophils 0.5 %    % Immature Granulocytes 0.0 %    Absolute Neutrophil 3.2 1.6 - 8.3 10e9/L    Absolute Lymphocytes 0.9 0.8 - 5.3 10e9/L    Absolute Monocytes 0.2 0.0 - 1.3 10e9/L    Absolute Eosinophils 0.1 0.0 - 0.7 10e9/L    Absolute Basophils 0.0 0.0 - 0.2 10e9/L    Abs Immature Granulocytes 0.0 0 - 0.4 10e9/L   Comprehensive metabolic panel   Result Value Ref Range    Sodium 141 134 - 144 mmol/L    Potassium 4.2 3.5 - 5.1 mmol/L    Chloride 105 98 - 107 mmol/L    Carbon Dioxide 28 21 - 31 mmol/L    Anion Gap 8 3 - 14 mmol/L    Glucose 139 (H) 70 - 105 mg/dL    Urea Nitrogen 15 7 - 25 mg/dL    Creatinine 1.05 0.60 - 1.20 mg/dL    GFR Estimate 50 (L) >60 mL/min/[1.73_m2]    GFR Estimate If Black 60 (L) >60 mL/min/[1.73_m2]    Calcium 9.4 8.6 - 10.3 mg/dL    Bilirubin Total 2.0 (H) 0.3 - 1.0 mg/dL    Albumin 3.7 3.5 - 5.7 g/dL    Protein Total 6.9 6.4 - 8.9 g/dL    Alkaline Phosphatase 269 (H) 34 - 104 U/L     (H) 7 - 52 U/L     (H) 13 - 39 U/L        Medications - No data to display    Assessments & Plan (with Medical Decision Making)     I have reviewed the nursing notes.    I have reviewed the findings, diagnosis, plan and need for follow up with the patient.    New Prescriptions    No medications on file       Final diagnoses:   Bradycardia   Check her electrolytes, no further management needed at this point given her comfort care status.  Discussed with the daughter.  She has been asymptomatic while here in the ER.  We will discharge her back to [place].    10/9/2019   [facility]     Maulik Beasley MD  10/10/19 0757

## 2019-10-09 NOTE — ED TRIAGE NOTES
"Pt to ER via EMS from [facility] after pt \"slummped forward\" while on toliet.  Found pulse at that time 37, grey in color, moaning and shaking head \"no\".   requested pt transfer to ER for low pulse.  EMS administers 0.5mg Atropine en route, pulse to 84, /65.  Pt normally non verbal, hx dementia.   "

## 2019-11-04 ENCOUNTER — NURSING HOME DICTATION (OUTPATIENT)
Dept: INTERNAL MEDICINE | Facility: OTHER | Age: 84
End: 2019-11-04

## 2019-11-04 ENCOUNTER — NURSING HOME VISIT (OUTPATIENT)
Dept: GERIATRICS | Facility: OTHER | Age: 84
End: 2019-11-04
Attending: NURSE PRACTITIONER
Payer: COMMERCIAL

## 2019-11-04 DIAGNOSIS — G30.1 LATE ONSET ALZHEIMER'S DISEASE WITHOUT BEHAVIORAL DISTURBANCE (H): Primary | ICD-10-CM

## 2019-11-04 DIAGNOSIS — F02.80 LATE ONSET ALZHEIMER'S DISEASE WITHOUT BEHAVIORAL DISTURBANCE (H): Primary | ICD-10-CM

## 2019-11-04 PROCEDURE — 99309 SBSQ NF CARE MODERATE MDM 30: CPT | Performed by: NURSE PRACTITIONER

## 2019-11-05 NOTE — PROGRESS NOTES
Visit Date:   11/04/2019      CHIEF COMPLAINT:  Sixty-day recertification.      HISTORY OF PRESENT ILLNESS:  Staff report the patient is overall doing well with the exception of some increasing behaviors.  She does have dementia and depression.  Remeron was reduced recently and she has had increasing behaviors.  She is followed by Psychiatry.  They are planning to update the psychiatrist when she is out again this week.  The patient has hypertension and is on metoprolol.  Her blood pressures have been well controlled but her pulse is frequently less than 60.  She was seen in the Emergency Department at the beginning of October for bradycardia and low blood pressure.  She was found to have elevated liver enzymes.  The patient is on comfort care and at that time Emergency Department physician discussed additional workup with family and they requested no further workup or evaluation.  She remains on comfort cares.  She has hypothyroidism.  She had thyroid labs checked a year ago.  She has not had any abdominal pain, jaundice, cristofer-colored stools, orange urine, etc.      PAST MEDICAL HISTORY, PAST SURGICAL HISTORY, ALLERGIES, MEDICATIONS, RISK FACTORS AND SOCIAL HISTORY:  All reviewed.      REVIEW OF SYSTEMS:  A 12-point complete review of systems discussed with nursing staff.  Resident has dementia which prevents her from giving from answering questions appropriately.      PHYSICAL EXAMINATION:   VITAL SIGNS:  Reviewed and stable with the exception of 58.     SKIN:  Skin color pink.     HEENT:  Sclerae nonicteric.  Mucous membranes moist.   NECK:  Supple without adenopathy.  No thyromegaly.   LUNGS:  Fields clear to auscultation throughout.   CARDIOVASCULAR:  Regular rate and rhythm, no S3 or murmurs auscultated.   ABDOMEN:  Soft, without masses, tenderness and organomegaly.   EXTREMITIES:  Without edema.  The patient has severe dementia.      ASSESSMENT:   1.  Dementia.   2.  Major depressive disorder.   3.   Hypertension.   4.  Hypothyroidism.   5.  Bradycardia.   6.  Elevated liver function enzymes.      PLAN:  Reduce metoprolol down to 12.5 mg daily.  The patient's family had requested no additional workup of the LFT elevations; therefore, will not order additional labs or workup at this point.  Continue with same medication and treatment plan with the exception of reducing the metoprolol.  Recommend discussing increasing behaviors with Psychiatry.         DEYVI LUI NP             D: 2019   T: 2019   MT: EVELINA      Name:     MINI DOMINGUEZ   MRN:      [id number]        Account:      [id number]   :      1933           Visit Date:   2019      Document: [id number]       cc: [facility] [place]        French Katz MD

## 2020-02-19 ENCOUNTER — NURSING HOME VISIT (OUTPATIENT)
Dept: GERIATRICS | Facility: OTHER | Age: 85
End: 2020-02-19
Attending: FAMILY MEDICINE
Payer: COMMERCIAL

## 2020-02-19 DIAGNOSIS — G30.1 LATE ONSET ALZHEIMER'S DISEASE WITHOUT BEHAVIORAL DISTURBANCE (H): Primary | ICD-10-CM

## 2020-02-19 DIAGNOSIS — F02.80 LATE ONSET ALZHEIMER'S DISEASE WITHOUT BEHAVIORAL DISTURBANCE (H): Primary | ICD-10-CM

## 2020-02-19 PROCEDURE — 99308 SBSQ NF CARE LOW MDM 20: CPT | Performed by: FAMILY MEDICINE

## 2020-02-20 NOTE — PROGRESS NOTES
Lynne Gutierrez is a 86 year old female being seen today for regulatory visit at [facility].    Code Status:  .   Health Care Power of : Extended Emergency Contact Information  Primary Emergency Contact: Ethan Gutierrez   [facility]  Home Phone: [phone]  Relation: Spouse  Secondary Emergency Contact: Chris Gutierrez  Relation: Son     Allergies: Latex; Lisinopril; Penicillins; and Sulfa drugs     Chief Complaint / HPI: pt non verbal.  Per nursing she remains ambulatory and vocalizes at times but no words.      Past Medical, Surgical, Family and Social History reviewed: YES.     Medications: reviewed  Medications - recent changes: no    Review of Systems:  unable  Toileting:    Continent of Bowel: No   Continent of Bladder: No  Mobility: independent     Recent Labs: None    Pertinent Screening Tool results: None    Current Therapies: none    Exam:  Vital signs reviewed. yes  GENERAL APPEARANCE: alert, no distress and comfortable.  Makes eye contact with me but does not answer any questions.    RESP: lungs clear to auscultation - no rales, rhonchi or wheezes  CV: regular rate and rhythm, normal S1 S2, no S3 or S4, no murmur, click or rub, no peripheral edema and peripheral pulses strong  ABDOMEN: soft, nontender, no hepatosplenomegaly, no masses and bowel sounds normal    Assessment and Plan:  (G30.1,  F02.80) Late onset Alzheimer's disease without behavioral disturbance (H)  (primary encounter diagnosis)  Comment: end stage and stable  Plan: supportive cares,

## 2020-04-23 ENCOUNTER — NURSING HOME VISIT (OUTPATIENT)
Dept: GERIATRICS | Facility: OTHER | Age: 85
End: 2020-04-23
Attending: NURSE PRACTITIONER
Payer: COMMERCIAL

## 2020-04-23 DIAGNOSIS — G30.1 LATE ONSET ALZHEIMER'S DISEASE WITHOUT BEHAVIORAL DISTURBANCE (H): Primary | ICD-10-CM

## 2020-04-23 DIAGNOSIS — F02.80 LATE ONSET ALZHEIMER'S DISEASE WITHOUT BEHAVIORAL DISTURBANCE (H): Primary | ICD-10-CM

## 2020-04-23 PROCEDURE — 99309 SBSQ NF CARE MODERATE MDM 30: CPT | Performed by: NURSE PRACTITIONER

## 2020-04-27 NOTE — PROGRESS NOTES
Visit Date:   04/23/2020      CHIEF COMPLAINT:  60-day recertification.      HISTORY OF PRESENT ILLNESS:  Nursing staff reports Lynne is doing well.  She has severe Alzheimer's dementia and is nonverbal.  She is not experiencing any behaviors.  She was treated with Remeron for major depressive disorder and has not had any crying or symptoms to suggest anxiety.  She is on metoprolol daily for hypertension and blood pressures range from 150/70 to 142/71 with pulse averaging 70.  She has hypothyroidism that is treated with daily Synthroid.  She has been taking the medication without difficulty.  In 11/2019, TSH was stable.  She was having some difficulties with maintaining her weight, but she is on Ensure 8 ounces 3 times daily and her weight has been stable at 125 pounds.  Her care is comfort focused due to her severe dementia.      PAST MEDICAL HISTORY, PAST SURGICAL HISTORY, ALLERGIES, MEDICATIONS, RISK FACTORS, SOCIAL HISTORY:  All reviewed.      ADVANCE DIRECTIVE:  Do not resuscitate with comfort focus.      REVIEW OF SYSTEMS:  A 12-point complete review of systems discussed with nursing staff, resident has dementia and cannot give information.  All negative with the exception of positive findings listed in the HPI.      PHYSICAL EXAMINATION:   VITAL SIGNS:  Blood pressure 122/68, temp 97.5, weight 125 pounds, pulse 70.    GENERAL:  A pleasantly demented female lying in bed comfortably in no acute distress.  She did awaken.  She is smiling.   SKIN: Color pink.   HEENT:  Mucous membranes moist.   NECK:  Supple without adenopathy.   LUNGS:  Fields clear to auscultation.   CARDIOVASCULAR:  Regular rate and rhythm.   ABDOMEN:  Soft and without masses, tenderness and organomegaly.   EXTREMITIES:  Without edema.      ASSESSMENT:   1.  Alzheimer's dementia.   2.  Major depressive disorder.   3.  Hypertension.   4.  Hypothyroidism.   5.  Weight loss.      PLAN:  Continue with same medication and treatment plan.  Her  weight is stable with the Ensure.  Continue with the Ensure.  Follow up as needed.         DEYVI LIU NP             D: 2020   T: 2020   MT: SOREN      Name:     MINI DOMINGUEZ   MRN:      [id number]        Account:      [id number]   :      1933           Visit Date:   2020      Document: [id number]       cc: [facility]        French Katz MD

## 2020-05-18 ENCOUNTER — RESULTS ONLY (OUTPATIENT)
Dept: LAB | Age: 85
End: 2020-05-18

## 2020-05-18 ENCOUNTER — MEDICAL CORRESPONDENCE (OUTPATIENT)
Dept: HEALTH INFORMATION MANAGEMENT | Facility: OTHER | Age: 85
End: 2020-05-18

## 2020-05-20 LAB
SARS-COV-2 RNA SPEC QL NAA+PROBE: NOT DETECTED
SPECIMEN SOURCE: NORMAL

## 2020-06-11 ENCOUNTER — NURSING HOME VISIT (OUTPATIENT)
Dept: GERIATRICS | Facility: OTHER | Age: 85
End: 2020-06-11
Attending: NURSE PRACTITIONER
Payer: COMMERCIAL

## 2020-06-11 DIAGNOSIS — F33.40 RECURRENT MAJOR DEPRESSIVE DISORDER, IN REMISSION (H): Primary | ICD-10-CM

## 2020-06-11 PROCEDURE — 99309 SBSQ NF CARE MODERATE MDM 30: CPT | Performed by: NURSE PRACTITIONER

## 2020-06-13 NOTE — PROGRESS NOTES
Visit Date:   06/11/2020      CHIEF COMPLAINT:  60-day recertification.      HISTORY OF PRESENT ILLNESS:  The patient suffers from severe dementia.  She currently is not having any issues with behaviors.  She has major depressive disorder, which is stable on Remeron 15 mg at bedtime.  This is also used for appetite stimulant.  She did struggle with significant weight loss with previous tapers.  For that reason, we will leave her on the Remeron as it currently is prescribed.  Her weight is stable at 127 pounds and when compared to 10/2019, her weight was 130 pounds.  She also is on medication for treatment of hypertension.  The only readings I am seeing at this time were completed on 06/07 and 4 blood pressure readings were obtained and these range from 155//85.  Her pulse has averaged in the 60s.  She is on metoprolol daily.  She has known hypothyroidism, with last TSH within normal limits in 11/2019.  Currently, receives levothyroxine 50 mcg daily.  Nursing staff reports she is doing well and has no concerns otherwise.      PAST MEDICAL HISTORY, PAST SURGICAL HISTORY, ALLERGIES, MEDICATIONS, RISK FACTORS, SOCIAL HISTORY:  All reviewed.      ADVANCE DIRECTIVE:  DNR comfort focus.      REVIEW OF SYSTEMS:  A 12-point complete review of systems discussed with nursing staff, resident's dementia prevents her from giving information.      PHYSICAL EXAMINATION:   GENERAL:  The patient is nonverbal, lying in bed, no acute distress.   VITAL SIGNS:  Blood pressure on 06/07 was 155/75, pulse 60, respiratory rate 20, weight 127 pounds.   SKIN:  Color pale.   HEENT:  Sclerae nonicteric.  Mucous membranes moist.   NECK:  Supple without adenopathy.  No thyromegaly.   LUNGS:  Fields clear to auscultation throughout.   CARDIOVASCULAR:  Regular rate and rhythm.   ABDOMEN:  Soft and without masses, tenderness and organomegaly.   EXTREMITIES:  Without edema.      ASSESSMENT:   1.  Dementia.   2.  Major depressive disorder.   3.   Hypertension.   4.  Hypothyroidism.      PLAN:  Recommend checking her blood pressure 3 times weekly for the next couple of weeks, then we will review blood pressures at that time to determine if she needs adjustment in medication.  Otherwise, renew same medication and treatment plan.         DEYVI LUI NP             D: 2020   T: 2020   MT: ANAHY      Name:     MINI DOMINGUEZ   MRN:      [id number]        Account:      [id number]   :      1933           Visit Date:   2020      Document: [id number]       cc: [facility]        French Katz MD

## 2020-06-15 ENCOUNTER — RESULTS ONLY (OUTPATIENT)
Dept: LAB | Age: 85
End: 2020-06-15

## 2020-06-15 ENCOUNTER — MEDICAL CORRESPONDENCE (OUTPATIENT)
Dept: HEALTH INFORMATION MANAGEMENT | Facility: OTHER | Age: 85
End: 2020-06-15

## 2020-06-15 ENCOUNTER — APPOINTMENT (OUTPATIENT)
Dept: GERIATRICS | Facility: OTHER | Age: 85
End: 2020-06-15
Attending: FAMILY MEDICINE
Payer: COMMERCIAL

## 2020-06-16 LAB
SARS-COV-2 RNA SPEC QL NAA+PROBE: NOT DETECTED
SPECIMEN SOURCE: NORMAL

## 2020-06-21 ENCOUNTER — MEDICAL CORRESPONDENCE (OUTPATIENT)
Dept: HEALTH INFORMATION MANAGEMENT | Facility: OTHER | Age: 85
End: 2020-06-21

## 2020-06-22 ENCOUNTER — RESULTS ONLY (OUTPATIENT)
Dept: LAB | Age: 85
End: 2020-06-22

## 2020-06-22 ENCOUNTER — APPOINTMENT (OUTPATIENT)
Dept: GERIATRICS | Facility: OTHER | Age: 85
End: 2020-06-22
Attending: FAMILY MEDICINE
Payer: COMMERCIAL

## 2020-06-23 LAB
SARS-COV-2 RNA SPEC QL NAA+PROBE: NOT DETECTED
SPECIMEN SOURCE: NORMAL

## 2020-06-24 ENCOUNTER — NURSING HOME VISIT (OUTPATIENT)
Dept: GERIATRICS | Facility: OTHER | Age: 85
End: 2020-06-24
Attending: NURSE PRACTITIONER
Payer: COMMERCIAL

## 2020-06-24 DIAGNOSIS — I10 BENIGN ESSENTIAL HYPERTENSION: Primary | ICD-10-CM

## 2020-06-24 PROCEDURE — 99308 SBSQ NF CARE LOW MDM 20: CPT | Performed by: NURSE PRACTITIONER

## 2020-06-26 NOTE — PROGRESS NOTES
Visit Date:   2020      CHIEF COMPLAINT:  Hypertension.      HISTORY OF PRESENT ILLNESS:  The patient is seen today for followup on hypertension.  She was evaluated on , and it was recommended that blood pressure to be checked 3 times weekly for a couple of weeks due to an isolated elevated blood pressure reading.  By reviewing her blood pressures, these range from 136/70 to 160/85.  Three of the 7 blood pressure readings have a systolic pressure greater than 150.  She currently is on metoprolol 25 mg half tablet daily.  The patient has severe dementia and does not answer questions.      PAST MEDICAL HISTORY, ALLERGIES, MEDICATIONS:  Reviewed.      REVIEW OF SYSTEMS:  Nursing staff reports no evidence of chest pain, chest pressure, shortness of breath or syncope.      PHYSICAL EXAMINATION:   GENERAL:  Pleasant female in no acute distress.   VITAL SIGNS:  Blood pressure 160/85, pulse 64, respiratory rate 18, temperature 97.4, SpO2 97% on room air.  She is sitting on the edge of her bed, smiling.  Cooperative.   SKIN:  Color pink.   LUNGS:  Fields clear to auscultation.   CARDIOVASCULAR:  Regular rate and rhythm.   EXTREMITIES:  Without edema.      ASSESSMENT:  Hypertension.      PLAN:  Increase metoprolol to 25 mg daily.  Continue to monitor blood pressures and follow up as needed.         DEYVI LUI NP             D: 2020   T: 2020   MT: GAGE      Name:     MINI DOMINGUEZ   MRN:      [id number]        Account:      [id number]   :      1933           Visit Date:   2020      Document: [id number]       cc: [facility]        French Katz MD

## 2020-07-02 ENCOUNTER — NURSING HOME VISIT (OUTPATIENT)
Dept: GERIATRICS | Facility: OTHER | Age: 85
End: 2020-07-02
Attending: NURSE PRACTITIONER
Payer: COMMERCIAL

## 2020-07-02 DIAGNOSIS — I10 BENIGN ESSENTIAL HYPERTENSION: Primary | ICD-10-CM

## 2020-07-02 PROCEDURE — 99307 SBSQ NF CARE SF MDM 10: CPT | Performed by: NURSE PRACTITIONER

## 2020-07-06 NOTE — PROGRESS NOTES
Visit Date:   2020      CHIEF COMPLAINT:  Followup hypertension.      HISTORY OF PRESENT ILLNESS:  The patient was seen on  due to hypertension.  Metoprolol was increased to 25 mg daily.  Since that time, blood pressures have been recorded, and these range from 136/72 to 137/79.  Her pulse has been ranging in the 60s.  She does have severe Lewy body dementia.  No witnessed episodes of syncope or orthostasis.  She has not had any witnessed chest pain, pressure or shortness of breath.      PAST MEDICAL HISTORY, ALLERGIES, MEDICATIONS:  Reviewed.      REVIEW OF SYSTEMS:  See HPI.      PHYSICAL EXAMINATION:   GENERAL:  Pleasant female lying in bed, no acute distress.  She has advanced Lewy body dementia.  She is nonverbal.   SKIN:  Color pink.   HEENT:  Mucous membranes moist.   LUNGS:  Fields clear to auscultation.   CARDIOVASCULAR:  Regular.   EXTREMITIES:  Without edema.      ASSESSMENT:   1.  Hypertension.   2.  Lewy body dementia.      PLAN:  Blood pressure well controlled.  Pulse is stable.  Continue with metoprolol 25 mg daily and follow up as needed.         DEYVI LUI NP             D: 2020   T: 2020   MT:       Name:     MINI DOMINGUEZ   MRN:      [id number]        Account:      [id number]   :      1933           Visit Date:   2020      Document: [id number]       cc: Kay Des[facility]        French Katz MD

## 2020-08-25 ENCOUNTER — RESULTS ONLY (OUTPATIENT)
Dept: LAB | Age: 85
End: 2020-08-25

## 2020-08-25 ENCOUNTER — MEDICAL CORRESPONDENCE (OUTPATIENT)
Dept: HEALTH INFORMATION MANAGEMENT | Facility: OTHER | Age: 85
End: 2020-08-25

## 2020-08-25 ENCOUNTER — APPOINTMENT (OUTPATIENT)
Dept: LAB | Facility: OTHER | Age: 85
End: 2020-08-25
Attending: NURSE PRACTITIONER
Payer: COMMERCIAL

## 2020-08-25 LAB
T4 FREE SERPL-MCNC: 0.89 NG/DL (ref 0.6–1.6)
TSH SERPL DL<=0.05 MIU/L-ACNC: 2.76 IU/ML (ref 0.34–5.6)

## 2020-09-16 ENCOUNTER — TRANSFERRED RECORDS (OUTPATIENT)
Dept: HEALTH INFORMATION MANAGEMENT | Facility: OTHER | Age: 85
End: 2020-09-16

## 2020-10-22 ENCOUNTER — NURSING HOME VISIT (OUTPATIENT)
Dept: GERIATRICS | Facility: OTHER | Age: 85
End: 2020-10-22
Attending: NURSE PRACTITIONER
Payer: COMMERCIAL

## 2020-10-22 DIAGNOSIS — F33.40 RECURRENT MAJOR DEPRESSIVE DISORDER, IN REMISSION (H): Primary | ICD-10-CM

## 2020-10-22 PROCEDURE — 99308 SBSQ NF CARE LOW MDM 20: CPT | Performed by: NURSE PRACTITIONER

## 2020-10-24 NOTE — PROGRESS NOTES
Visit Date:   10/22/2020      CHIEF COMPLAINT:  60-day recertification.      HISTORY OF PRESENT ILLNESS:  The patient has history of severe Alzheimer's disease and major depressive disorder.  She takes Remeron.  Her weight has been stable.  She is not having any behaviors.  This is followed by Psychiatry.  Also has hypothyroidism and is on Synthroid.  TSH and free T4 were normal in 08/2020.  She is on metoprolol for hypertension, with controlled blood pressures ranging from 107/70 to 130/64.  She has a history of atrial fibrillation, but not on any anticoagulation and her rate is controlled, with pulse in the 60s.  Nursing staff reports she is doing well.  The patient cannot contribute to her history due to severe dementia.      PAST MEDICAL HISTORY, PAST SURGICAL HISTORY, ALLERGIES, MEDICATIONS, RISK FACTORS, SOCIAL HISTORY:  All reviewed.      ADVANCE DIRECTIVE:  Do not resuscitate.      REVIEW OF SYSTEMS:  See HPI for positive findings, otherwise unremarkable as reviewed and discussed with the nursing staff.      PHYSICAL EXAMINATION:   VITAL SIGNS:  Temp 98, pulse 67, respiratory rate 18, blood pressure 130/64, SpO2 of 94% on room air.   GENERAL:  Pleasant female lying in her bed, no acute distress.   SKIN:  Color pink.   HEENT:  Mucous membranes moist.   NECK:  Supple without adenopathy.   LUNGS:  Fields clear to auscultation.   CARDIOVASCULAR:  Regular.   ABDOMEN:  Soft and without masses, tenderness or organomegaly.   EXTREMITIES:  Without edema.      ASSESSMENT:   1.  Alzheimer's disease.   2.  Major depressive disorder.   3.  Hypothyroidism.   4.  Hypertension.   5.  Atrial fibrillation.      PLAN:  The patient is stable.  Continue management of Alzheimer's disease and depression with Psychiatry.  She is not due for any blood work.  Renew orders at this time.         DEYVI LUI NP             D: 10/22/2020   T: 10/22/2020   MT: ANAHY      Name:     MINI DOMINGUEZ   MRN:      [id number]         Account:      [id number]   :      1933           Visit Date:   10/22/2020      Document: [id number]       cc: [facility]        French Katz MD

## 2021-01-01 ENCOUNTER — NURSING HOME VISIT (OUTPATIENT)
Dept: GERIATRICS | Facility: OTHER | Age: 86
End: 2021-01-01
Payer: COMMERCIAL

## 2021-01-01 ENCOUNTER — APPOINTMENT (OUTPATIENT)
Dept: GERIATRICS | Facility: OTHER | Age: 86
End: 2021-01-01
Attending: NURSE PRACTITIONER
Payer: COMMERCIAL

## 2021-01-01 VITALS
TEMPERATURE: 98 F | DIASTOLIC BLOOD PRESSURE: 60 MMHG | HEART RATE: 58 BPM | WEIGHT: 108 LBS | SYSTOLIC BLOOD PRESSURE: 125 MMHG | RESPIRATION RATE: 18 BRPM | OXYGEN SATURATION: 92 %

## 2021-01-01 VITALS
SYSTOLIC BLOOD PRESSURE: 150 MMHG | OXYGEN SATURATION: 97 % | WEIGHT: 109 LBS | TEMPERATURE: 97.4 F | RESPIRATION RATE: 16 BRPM | DIASTOLIC BLOOD PRESSURE: 80 MMHG | HEART RATE: 86 BPM

## 2021-01-01 DIAGNOSIS — U07.1 INFECTION DUE TO 2019 NOVEL CORONAVIRUS: Primary | ICD-10-CM

## 2021-01-01 DIAGNOSIS — G30.1 LATE ONSET ALZHEIMER'S DISEASE WITHOUT BEHAVIORAL DISTURBANCE (H): ICD-10-CM

## 2021-01-01 DIAGNOSIS — E03.9 ACQUIRED HYPOTHYROIDISM: ICD-10-CM

## 2021-01-01 DIAGNOSIS — F02.80 LATE ONSET ALZHEIMER'S DISEASE WITHOUT BEHAVIORAL DISTURBANCE (H): Primary | ICD-10-CM

## 2021-01-01 DIAGNOSIS — U07.1 INFECTION DUE TO 2019 NOVEL CORONAVIRUS: ICD-10-CM

## 2021-01-01 DIAGNOSIS — N18.31 STAGE 3A CHRONIC KIDNEY DISEASE (H): ICD-10-CM

## 2021-01-01 DIAGNOSIS — I48.21 PERMANENT ATRIAL FIBRILLATION (H): ICD-10-CM

## 2021-01-01 DIAGNOSIS — F33.40 RECURRENT MAJOR DEPRESSIVE DISORDER, IN REMISSION (H): ICD-10-CM

## 2021-01-01 DIAGNOSIS — I10 ESSENTIAL HYPERTENSION: ICD-10-CM

## 2021-01-01 DIAGNOSIS — F02.80 LATE ONSET ALZHEIMER'S DISEASE WITHOUT BEHAVIORAL DISTURBANCE (H): ICD-10-CM

## 2021-01-01 DIAGNOSIS — G30.1 LATE ONSET ALZHEIMER'S DISEASE WITHOUT BEHAVIORAL DISTURBANCE (H): Primary | ICD-10-CM

## 2021-01-01 PROCEDURE — 99309 SBSQ NF CARE MODERATE MDM 30: CPT | Performed by: NURSE PRACTITIONER

## 2021-01-01 PROCEDURE — 99308 SBSQ NF CARE LOW MDM 20: CPT | Performed by: NURSE PRACTITIONER

## 2021-01-01 ASSESSMENT — ENCOUNTER SYMPTOMS
WOUND: 0
CONFUSION: 1
SPEECH DIFFICULTY: 1
SHORTNESS OF BREATH: 0
COUGH: 0
DIARRHEA: 0
VOMITING: 0
STRIDOR: 0
ARTHRALGIAS: 0
WHEEZING: 0
UNEXPECTED WEIGHT CHANGE: 0
TREMORS: 0
TROUBLE SWALLOWING: 1
COUGH: 0
DIARRHEA: 0
ANAL BLEEDING: 0
VOMITING: 0
ABDOMINAL DISTENTION: 0
HEMATURIA: 0
DIAPHORESIS: 0
DIFFICULTY URINATING: 0
ABDOMINAL PAIN: 0
HEMATOCHEZIA: 0
FEVER: 0
WEAKNESS: 0
SHORTNESS OF BREATH: 0

## 2021-01-06 ENCOUNTER — NURSING HOME VISIT (OUTPATIENT)
Dept: GERIATRICS | Facility: OTHER | Age: 86
End: 2021-01-06
Attending: NURSE PRACTITIONER
Payer: COMMERCIAL

## 2021-01-06 DIAGNOSIS — F02.80 LATE ONSET ALZHEIMER'S DISEASE WITHOUT BEHAVIORAL DISTURBANCE (H): Primary | ICD-10-CM

## 2021-01-06 DIAGNOSIS — G30.1 LATE ONSET ALZHEIMER'S DISEASE WITHOUT BEHAVIORAL DISTURBANCE (H): Primary | ICD-10-CM

## 2021-01-06 PROCEDURE — 99308 SBSQ NF CARE LOW MDM 20: CPT | Performed by: NURSE PRACTITIONER

## 2021-01-08 NOTE — PROGRESS NOTES
Visit Date:   2021      CHIEF COMPLAINT:  A 60-day recertification.      HISTORY OF PRESENT ILLNESS:  The patient has lived at skilled nursing facility for several years now.  Overall, she has been fairly stable with some progression of her Alzheimer's disease.  Her depression has been stable.  She is on Remeron.  She has hypothyroidism that is treated with replacement therapy.  That has been stable.  Blood pressures continue to be well controlled.  She does have atrial fibrillation, but is not anticoagulated.  Her weight is stable at 124 pounds.  By review of labs in 2020, she had normal thyroid labs.      PAST MEDICAL HISTORY, PAST SURGICAL HISTORY, ALLERGIES, MEDICATIONS, RISK FACTORS, SOCIAL HISTORY:  All reviewed.      ADVANCE DIRECTIVE:  Do not resuscitate, comfort-focused care.      REVIEW OF SYSTEMS:  A 12-point complete review of systems discussed with nursing staff.  Resident with underlying dementia; therefore cannot give information.      PHYSICAL EXAMINATION:   VITAL SIGNS:  Blood pressure 123/71, pulse 65, respiratory rate 16, temperature 97.8, weight 124 pounds, lying in bed, comfortable.   GENERAL:  Alert, but not oriented to person, place or time.   SKIN:  Color pink.   HEENT:  Mucous membranes moist.   NECK:  Supple without adenopathy.  No thyromegaly.   LUNGS:  Fields clear to auscultation throughout.   CARDIOVASCULAR:  Irregularly irregular.   ABDOMEN:  Soft and without masses, tenderness or organomegaly.   EXTREMITIES:  Without edema.      ASSESSMENT:   1.  Alzheimer disease.   2.  Major depressive disorder.   3.  Hypothyroidism.   4.  Hypertension.   5.  Atrial fibrillation.      PLAN:  Overall, patient has been stable.  She is up-to-date on lab work.  We will renew orders at this time.         DEYVI LUI NP             D: 2021   T: 2021   MT:       Name:     MINI DOMINGUEZ   MRN:      [id number]        Account:      [id number]   :      1933            Visit Date:   01/06/2021      Document: [id number]       cc: French Katz MD

## 2021-03-08 ENCOUNTER — NURSING HOME VISIT (OUTPATIENT)
Dept: GERIATRICS | Facility: OTHER | Age: 86
End: 2021-03-08
Attending: NURSE PRACTITIONER
Payer: COMMERCIAL

## 2021-03-08 DIAGNOSIS — F02.80 LATE ONSET ALZHEIMER'S DISEASE WITHOUT BEHAVIORAL DISTURBANCE (H): Primary | ICD-10-CM

## 2021-03-08 DIAGNOSIS — G30.1 LATE ONSET ALZHEIMER'S DISEASE WITHOUT BEHAVIORAL DISTURBANCE (H): Primary | ICD-10-CM

## 2021-03-08 PROCEDURE — 99309 SBSQ NF CARE MODERATE MDM 30: CPT | Performed by: NURSE PRACTITIONER

## 2021-03-09 ENCOUNTER — RESULTS ONLY (OUTPATIENT)
Dept: LAB | Age: 86
End: 2021-03-09

## 2021-03-09 ENCOUNTER — MEDICAL CORRESPONDENCE (OUTPATIENT)
Dept: HEALTH INFORMATION MANAGEMENT | Facility: OTHER | Age: 86
End: 2021-03-09

## 2021-03-09 LAB
T4 FREE SERPL-MCNC: 0.99 NG/DL (ref 0.6–1.6)
TSH SERPL DL<=0.05 MIU/L-ACNC: 1.49 IU/ML (ref 0.34–5.6)

## 2021-03-09 NOTE — PROGRESS NOTES
Visit Date:   03/08/2021      CHIEF COMPLAINT:  60-day recertification.      HISTORY OF PRESENT ILLNESS:  Since last recertification, the patient's weight is down 6 pounds.  Nursing staff are concerned about her weight loss.  She had problems maintaining her weight in the past but has been stable for quite some time.  Her weight now is 118 pounds and at last visit was 124 pounds.  She does have history of hypothyroidism.  She had normal TSH and free T4 in 08/2020.  She has Alzheimer's disease and major depressive disorder.  She is followed by Psychiatry.  She did have some medication changes on 02/09, which included starting Aricept, Namenda and Risperdal.  She is on Remeron 15 mg daily with no changes made medication.  She also continues on nutritional supplement 6 ounces 3 times daily for weight maintenance.  This is usually managed by dietitian.  She has hypertension which has been well controlled on metoprolol 25 mg daily without any recent changes.        Nursing staff reports that besides the weight loss, she has been in her usual state of health.      PAST MEDICAL HISTORY, PAST SURGICAL HISTORY, ALLERGIES, MEDICATIONS, RISK FACTORS, SOCIAL HISTORY:  All reviewed.      ADVANCE DIRECTIVE:  Do not resuscitate; comfort-focused care.      REVIEW OF SYSTEMS:  A 12-point complete review of systems discussed with nursing staff.  The patient is nonverbal.  All negative with the exception of positive findings listed in the HPI.      PHYSICAL EXAMINATION:   GENERAL:  A pleasant female sitting in in a chair in no acute distress.  Nonverbal.   HEENT:  Sclerae nonicteric.  Conjunctivae noninflamed.  Mucous membranes moist.   NECK:  Supple and without adenopathy.  No thyromegaly.   LUNGS:  Fields clear to auscultation throughout.   CARDIOVASCULAR:  Regular rate and rhythm with no S3 auscultated.   ABDOMEN:  Soft and without masses, tenderness and organomegaly.  No suprapubic tenderness.   EXTREMITIES:  Without edema.       ASSESSMENT:   1.  Alzheimer disease.   2.  Major depressive disorder.   3.  Weight loss.   4.  Hypothyroidism.   5.  Hypertension.   6.  Atrial fibrillation.      PLAN:  We will increase her nutritional supplements to 8 ounces 3 times daily.  Also plan to check TSH and free T4 next lab day. If thyroid labs are normal, or especially if weight loss continues, then would recommend that nursing staff review medication changes with psychiatrist, as her weight loss could be related to recent medication changes.         DEYVI LUI NP             D: 2021   T: 2021   MT: GAGE      Name:     MINI DOMINGUEZ   MRN:      [id number]        Account:      [id number]   :      1933           Visit Date:   2021      Document: [id number]       cc: [facility] [place]        French Katz MD

## 2021-03-29 ENCOUNTER — NURSING HOME VISIT (OUTPATIENT)
Dept: GERIATRICS | Facility: OTHER | Age: 86
End: 2021-03-29
Attending: NURSE PRACTITIONER
Payer: COMMERCIAL

## 2021-03-29 DIAGNOSIS — F02.80 LATE ONSET ALZHEIMER'S DISEASE WITHOUT BEHAVIORAL DISTURBANCE (H): Primary | ICD-10-CM

## 2021-03-29 DIAGNOSIS — G30.1 LATE ONSET ALZHEIMER'S DISEASE WITHOUT BEHAVIORAL DISTURBANCE (H): Primary | ICD-10-CM

## 2021-03-29 PROCEDURE — 99309 SBSQ NF CARE MODERATE MDM 30: CPT | Performed by: NURSE PRACTITIONER

## 2021-03-30 ENCOUNTER — MEDICAL CORRESPONDENCE (OUTPATIENT)
Dept: HEALTH INFORMATION MANAGEMENT | Facility: OTHER | Age: 86
End: 2021-03-30

## 2021-03-30 ENCOUNTER — RESULTS ONLY (OUTPATIENT)
Dept: LAB | Age: 86
End: 2021-03-30

## 2021-03-30 ENCOUNTER — APPOINTMENT (OUTPATIENT)
Dept: LAB | Facility: OTHER | Age: 86
End: 2021-03-30
Attending: NURSE PRACTITIONER
Payer: COMMERCIAL

## 2021-03-30 LAB
ALBUMIN SERPL-MCNC: 3.5 G/DL (ref 3.5–5.7)
ALBUMIN UR-MCNC: 10 MG/DL
ALP SERPL-CCNC: 53 U/L (ref 34–104)
ALT SERPL W P-5'-P-CCNC: 8 U/L (ref 7–52)
AMORPH CRY #/AREA URNS HPF: ABNORMAL /HPF
ANION GAP SERPL CALCULATED.3IONS-SCNC: 6 MMOL/L (ref 3–14)
APPEARANCE UR: ABNORMAL
AST SERPL W P-5'-P-CCNC: 18 U/L (ref 13–39)
BILIRUB SERPL-MCNC: 0.8 MG/DL (ref 0.3–1)
BILIRUB UR QL STRIP: NEGATIVE
BUN SERPL-MCNC: 19 MG/DL (ref 7–25)
CALCIUM SERPL-MCNC: 9.1 MG/DL (ref 8.6–10.3)
CHLORIDE SERPL-SCNC: 109 MMOL/L (ref 98–107)
CO2 SERPL-SCNC: 28 MMOL/L (ref 21–31)
COLOR UR AUTO: ABNORMAL
CREAT SERPL-MCNC: 0.96 MG/DL (ref 0.6–1.2)
ERYTHROCYTE [DISTWIDTH] IN BLOOD BY AUTOMATED COUNT: 14 % (ref 10–15)
GFR SERPL CREATININE-BSD FRML MDRD: 55 ML/MIN/{1.73_M2}
GLUCOSE SERPL-MCNC: 80 MG/DL (ref 70–105)
GLUCOSE UR STRIP-MCNC: NEGATIVE MG/DL
HCT VFR BLD AUTO: 38 % (ref 35–47)
HGB BLD-MCNC: 12.7 G/DL (ref 11.7–15.7)
HGB UR QL STRIP: NEGATIVE
KETONES UR STRIP-MCNC: NEGATIVE MG/DL
LEUKOCYTE ESTERASE UR QL STRIP: ABNORMAL
MCH RBC QN AUTO: 31.5 PG (ref 26.5–33)
MCHC RBC AUTO-ENTMCNC: 33.4 G/DL (ref 31.5–36.5)
MCV RBC AUTO: 94 FL (ref 78–100)
MUCOUS THREADS #/AREA URNS LPF: PRESENT /LPF
NITRATE UR QL: NEGATIVE
PH UR STRIP: 5.5 PH (ref 5–7)
PLATELET # BLD AUTO: 127 10E9/L (ref 150–450)
POTASSIUM SERPL-SCNC: 4.1 MMOL/L (ref 3.5–5.1)
PROT SERPL-MCNC: 6 G/DL (ref 6.4–8.9)
RBC # BLD AUTO: 4.03 10E12/L (ref 3.8–5.2)
RBC #/AREA URNS AUTO: 3 /HPF (ref 0–2)
SODIUM SERPL-SCNC: 143 MMOL/L (ref 134–144)
SOURCE: ABNORMAL
SP GR UR STRIP: 1.02 (ref 1–1.03)
SQUAMOUS #/AREA URNS AUTO: 2 /HPF (ref 0–1)
UROBILINOGEN UR STRIP-MCNC: NORMAL MG/DL (ref 0–2)
WBC # BLD AUTO: 2.6 10E9/L (ref 4–11)
WBC #/AREA URNS AUTO: 60 /HPF (ref 0–5)
WBC CLUMPS #/AREA URNS HPF: PRESENT /HPF

## 2021-03-30 NOTE — PROGRESS NOTES
Visit Date:   03/29/2021      PROVIDER:  Yudith Rubio NP      CHIEF COMPLAINT:  Increasing confusion and behaviors, attempted elopement and tearfulness.      HISTORY OF PRESENT ILLNESS:  Over the last 3 days the patient has had consistent tearfulness and increasing behaviors with staff during cares.  This is unusual for her.  Her behaviors have been progressing, however, over the last month.  She did have some medication changes with Psychiatry in February and then again in March.  She is on a new antipsychotic.  Nursing staff, however, report that this morning they took resident to the restroom and after she urinated she was holding the pubic region and crying.  They asked her if she was having pain and she just kept moaning.  She is voiding normal amounts of urine.  They have not noticed any blood in the urine.  By reviewing her bowel movement log, it shows that she is having a bowel movement every 3-4 days.  She did have a normal TSH and free T4 in March.  This was checked due to weight loss.  Her weight has been stable since that visit.  Otherwise no falls, injuries or any new concerns.  She has been afebrile.  Staff have not noticed any nausea, vomiting, etc.      PAST MEDICAL HISTORY, ALLERGIES, MEDICATIONS:  Reviewed.      REVIEW OF SYSTEMS:  See HPI.      PHYSICAL EXAMINATION:   GENERAL:  Elderly female with dementia who is tearful and somewhat distraught.  I have known the patient for many years and I have never seen her in this type of distress.  Difficult to determine whether this is based on psychological or physical difficulties.   VITAL SIGNS:  Blood pressure 131/80, pulse 73, respiratory rate 18, temperature 98, SpO2 96% on room air.   SKIN:  Color pink.   HEENT:  Sclerae nonicteric.  Mucous membranes moist.   NECK:  Supple and without adenopathy.   PULMONARY:  Lung fields clear to auscultation throughout.   CARDIOVASCULAR:  Regular rate and rhythm.   ABDOMEN:  Soft and without masses,  tenderness and organomegaly.  No suprapubic tenderness.   EXTREMITIES:  Without edema.      LABORATORY DATA:  Recent labs reviewed.      ASSESSMENT:   1.  Dementia with increasing behaviors.   2.  Tearfulness.   3.  Constipation.   4.  Dysuria.      PLAN:  We will check UA/UC but in the meantime also would like to check CBC and Chem-13 due to her change in behavior.  Also, start Senna-S 1 tablet twice daily.  Constipation can cause increasing confusion and can also cause bladder symptoms.  Would like to follow up if her bowel movement frequency has not improved to approximately every 1-2 days or if her pain continues.         DEYVI LUI NP             D: 2021   T: 2021   MT: CHANTEL      Name:     MINI DOMINGUEZ   MRN:      [id number]        Account:      [id number]   :      1933           Visit Date:   2021      Document: [id number]       cc: [facility]        French Katz MD

## 2021-03-31 ENCOUNTER — NURSING HOME VISIT (OUTPATIENT)
Dept: GERIATRICS | Facility: OTHER | Age: 86
End: 2021-03-31
Attending: NURSE PRACTITIONER
Payer: COMMERCIAL

## 2021-03-31 DIAGNOSIS — N30.00 ACUTE CYSTITIS WITHOUT HEMATURIA: ICD-10-CM

## 2021-03-31 PROCEDURE — 99308 SBSQ NF CARE LOW MDM 20: CPT | Performed by: NURSE PRACTITIONER

## 2021-04-02 LAB
BACTERIA SPEC CULT: ABNORMAL
SPECIMEN SOURCE: ABNORMAL

## 2021-04-05 PROBLEM — N30.00 ACUTE CYSTITIS WITHOUT HEMATURIA: Status: ACTIVE | Noted: 2021-04-05

## 2021-04-05 NOTE — PROGRESS NOTES
Visit Date:   2021      CHIEF COMPLAINT:  Followup abnormal labs.      HISTORY OF PRESENT ILLNESS:  The patient was seen on Monday.  She had suspected pain with urination.  She was more confused.  She has chronic dementia, but this was outside of her baseline.  She is seen today for lab review.  She did have urinalysis which showed moderate leukocyte esterase, 60 white blood cells, 3 red blood cells and mucus present  this morning.  She was started on nitrofurantoin 50 mg 4 times daily for 7 days.  Chemistry panel was overall unremarkable.  CBC, however, showed leukopenia with a white blood cell count at 2.6 and thrombocytopenia with a platelet count of 127,000.  Hemoglobin was normal.  This is compared to the patient's previous with no abnormality seen.  Nursing staff reports that the patient continues to have increasing confusion and tearfulness.  At this time, she is lying in bed comfortably.      PAST MEDICAL HISTORY, ALLERGIES, MEDICATIONS:  Reviewed.      PHYSICAL EXAMINATION:   VITAL SIGNS:  Blood pressure 143/74, pulse 54, respiratory rate 20, temperature 98.3.   GENERAL:  Pleasant female lying in bed, sleeping.  I did not awaken her.      ASSESSMENT:   1.  Urinary tract infection.   2.  Leukopenia.   3.  Thrombocytopenia.      PLAN:  By review of her chart, the patient is recently on new psychotropic medications.  She was started on Risperdal back in February.  Risperdal can cause a leukopenia.  She currently has a urinary tract infection.  Urine culture is pending.  Continue the nitrofurantoin.  Would also recommend a followup on CBC in 1 week.  If she continues to have the abnormalities, then we will have her evaluated by her psychiatrist to review the Risperdal as a potential cause.         DEYVI LUI NP             D: 2021   T: 2021   MT: GAGE      Name:     MINI DOMINGUEZ   MRN:      [id number]        Account:      [id number]   :      1933           Visit  Date:   03/31/2021      Document: [id number]       cc: [facility]        French Katz MD

## 2021-04-06 ENCOUNTER — MEDICAL CORRESPONDENCE (OUTPATIENT)
Dept: HEALTH INFORMATION MANAGEMENT | Facility: OTHER | Age: 86
End: 2021-04-06

## 2021-04-06 ENCOUNTER — RESULTS ONLY (OUTPATIENT)
Dept: LAB | Age: 86
End: 2021-04-06

## 2021-04-06 ENCOUNTER — APPOINTMENT (OUTPATIENT)
Dept: LAB | Facility: OTHER | Age: 86
End: 2021-04-06
Attending: NURSE PRACTITIONER
Payer: COMMERCIAL

## 2021-04-06 LAB
ERYTHROCYTE [DISTWIDTH] IN BLOOD BY AUTOMATED COUNT: 13.2 % (ref 10–15)
HCT VFR BLD AUTO: 39.9 % (ref 35–47)
HGB BLD-MCNC: 13.6 G/DL (ref 11.7–15.7)
MCH RBC QN AUTO: 31.3 PG (ref 26.5–33)
MCHC RBC AUTO-ENTMCNC: 34.1 G/DL (ref 31.5–36.5)
MCV RBC AUTO: 92 FL (ref 78–100)
PLATELET # BLD AUTO: 124 10E9/L (ref 150–450)
RBC # BLD AUTO: 4.34 10E12/L (ref 3.8–5.2)
WBC # BLD AUTO: 2.7 10E9/L (ref 4–11)

## 2021-04-27 ENCOUNTER — RESULTS ONLY (OUTPATIENT)
Dept: LAB | Age: 86
End: 2021-04-27

## 2021-04-27 ENCOUNTER — MEDICAL CORRESPONDENCE (OUTPATIENT)
Dept: HEALTH INFORMATION MANAGEMENT | Facility: OTHER | Age: 86
End: 2021-04-27

## 2021-04-27 LAB
BASOPHILS # BLD AUTO: 0 10E9/L (ref 0–0.2)
BASOPHILS NFR BLD AUTO: 1 %
DIFFERENTIAL METHOD BLD: ABNORMAL
EOSINOPHIL # BLD AUTO: 0.1 10E9/L (ref 0–0.7)
EOSINOPHIL NFR BLD AUTO: 2.4 %
ERYTHROCYTE [DISTWIDTH] IN BLOOD BY AUTOMATED COUNT: 13.6 % (ref 10–15)
HCT VFR BLD AUTO: 38.6 % (ref 35–47)
HGB BLD-MCNC: 13.5 G/DL (ref 11.7–15.7)
IMM GRANULOCYTES # BLD: 0 10E9/L (ref 0–0.4)
IMM GRANULOCYTES NFR BLD: 0.3 %
LYMPHOCYTES # BLD AUTO: 1.1 10E9/L (ref 0.8–5.3)
LYMPHOCYTES NFR BLD AUTO: 36.6 %
MCH RBC QN AUTO: 32 PG (ref 26.5–33)
MCHC RBC AUTO-ENTMCNC: 35 G/DL (ref 31.5–36.5)
MCV RBC AUTO: 92 FL (ref 78–100)
MONOCYTES # BLD AUTO: 0.2 10E9/L (ref 0–1.3)
MONOCYTES NFR BLD AUTO: 6.4 %
NEUTROPHILS # BLD AUTO: 1.6 10E9/L (ref 1.6–8.3)
NEUTROPHILS NFR BLD AUTO: 53.3 %
PLATELET # BLD AUTO: 156 10E9/L (ref 150–450)
RBC # BLD AUTO: 4.22 10E12/L (ref 3.8–5.2)
WBC # BLD AUTO: 3 10E9/L (ref 4–11)

## 2021-05-05 ENCOUNTER — NURSING HOME VISIT (OUTPATIENT)
Dept: INTERNAL MEDICINE | Facility: OTHER | Age: 86
End: 2021-05-05
Payer: COMMERCIAL

## 2021-05-05 VITALS
WEIGHT: 116 LBS | RESPIRATION RATE: 18 BRPM | SYSTOLIC BLOOD PRESSURE: 141 MMHG | HEART RATE: 66 BPM | TEMPERATURE: 97.1 F | DIASTOLIC BLOOD PRESSURE: 80 MMHG

## 2021-05-05 DIAGNOSIS — G30.1 LATE ONSET ALZHEIMER'S DISEASE WITHOUT BEHAVIORAL DISTURBANCE (H): Primary | ICD-10-CM

## 2021-05-05 DIAGNOSIS — I63.9 CEREBRAL INFARCTION, UNSPECIFIED MECHANISM (H): ICD-10-CM

## 2021-05-05 DIAGNOSIS — I10 ESSENTIAL HYPERTENSION: ICD-10-CM

## 2021-05-05 DIAGNOSIS — E03.9 ACQUIRED HYPOTHYROIDISM: ICD-10-CM

## 2021-05-05 DIAGNOSIS — D70.2 OTHER DRUG-INDUCED NEUTROPENIA (H): ICD-10-CM

## 2021-05-05 DIAGNOSIS — F33.40 RECURRENT MAJOR DEPRESSIVE DISORDER, IN REMISSION (H): ICD-10-CM

## 2021-05-05 DIAGNOSIS — R63.4 WEIGHT LOSS: ICD-10-CM

## 2021-05-05 DIAGNOSIS — I48.21 PERMANENT ATRIAL FIBRILLATION (H): ICD-10-CM

## 2021-05-05 DIAGNOSIS — F02.80 LATE ONSET ALZHEIMER'S DISEASE WITHOUT BEHAVIORAL DISTURBANCE (H): Primary | ICD-10-CM

## 2021-05-05 PROCEDURE — 99310 SBSQ NF CARE HIGH MDM 45: CPT | Performed by: NURSE PRACTITIONER

## 2021-05-05 ASSESSMENT — ENCOUNTER SYMPTOMS
ADENOPATHY: 0
CHEST TIGHTNESS: 0
DIFFICULTY URINATING: 0
HEMATURIA: 0
HEARTBURN: 0
WEAKNESS: 1
FEVER: 0
ABDOMINAL PAIN: 0
VOMITING: 0
APPETITE CHANGE: 1
ARTHRALGIAS: 0
WOUND: 0
WHEEZING: 0
DIZZINESS: 0
SHORTNESS OF BREATH: 0
PALPITATIONS: 0
UNEXPECTED WEIGHT CHANGE: 1
CONFUSION: 1
TROUBLE SWALLOWING: 0
DIARRHEA: 0
HEMATOCHEZIA: 0
ACTIVITY CHANGE: 0

## 2021-05-05 NOTE — PROGRESS NOTES
Subjective:  She is seen today for 60-day recertification.  Patient has severe Alzheimer's dementia without any current behaviors.  She has hypothyroidism with last TSH and free T4 normal on March 9, 2021.  She has chronic atrial fibrillation that has been stable.  She is only anticoagulated with aspirin and is on metoprolol for hypertension and rate control.  She is not anticoagulated further secondary to history of falls.  She has previous history of CVA.  She has major depressive disorder and weight loss.  She is on Remeron 15 mg at bedtime.  Her weight is down 3 pounds in the past month.  She has evidence of protein malnutrition on lab work completed on March 30, 2021 with a total protein low at 6 and albumin 3.5.  She is not on any protein supplements.  She is followed by psychiatry and had been started on respite all a couple of months ago.  She developed leukopenia after starting the medication with a white blood cell count of 2.6.  Recheck 1 week later at 2.7 and medication was discontinued and recheck lab on April 27 with a WBC of 3.0 and trending back to normal.      Patient Active Problem List   Diagnosis     Acquired hypothyroidism     Alzheimer's disease (H)     Atrial fibrillation (H)     Cerebral infarction (H)     Hypertension     Transient cerebral ischemia     Mild protein-calorie malnutrition (H)     Recurrent major depressive disorder, in remission (H)     Acute cystitis without hematuria     Past Medical History:   Diagnosis Date     Agoraphobia     No Comments Provided     Anxiety disorder     No Comments Provided     Atrial fibrillation (H)     12/28/2012     Autoimmune thyroiditis     No Comments Provided     Bursopathy     4/9/2012     Carcinoma in situ     removed from upper back     Cerebral infarction (H)     12/2/2012,Acute Infarct, posterior left parietal region     Chronic kidney disease, stage III (moderate) (H)     No Comments Provided     Diffuse cystic mastopathy of breast     No  Comments Provided     Disorder of cartilage     No Comments Provided     Essential (primary) hypertension     No Comments Provided     Ganglion     6/7/2012     Hypothyroidism     No Comments Provided     Noninfective gastroenteritis and colitis     No Comments Provided     Noninfective gastroenteritis and colitis     Microscopic colitis     Obesity     No Comments Provided     Pneumonitis due to inhalation of food and vomit (H)     1/14/2014     Pure hypercholesterolemia     No Comments Provided     Synovial cyst of popliteal space     9/6/2011     Past Surgical History:   Procedure Laterality Date     ARTHROSCOPY KNEE      09/04,Status post left knee arthroscopy     BIOPSY BREAST      Breast biopsy x 2, both benign     COLONOSCOPY      1999     EXTRACAPSULAR CATARACT EXTRATION WITH INTRAOCULAR LENS IMPLANT      2009,Bilateral cataract surgery     LAPAROSCOPIC TUBAL LIGATION      No Comments Provided     RELEASE CARPAL TUNNEL      2011,Right carpal tunnel release and trigger fingers x 2     Social History     Socioeconomic History     Marital status:      Spouse name: Not on file     Number of children: Not on file     Years of education: Not on file     Highest education level: Not on file   Occupational History     Not on file   Social Needs     Financial resource strain: Not on file     Food insecurity     Worry: Not on file     Inability: Not on file     Transportation needs     Medical: Not on file     Non-medical: Not on file   Tobacco Use     Smoking status: Never Smoker     Smokeless tobacco: Never Used   Substance and Sexual Activity     Alcohol use: No     Drug use: Unknown     Types: Other     Comment: Drug use: No     Sexual activity: Not on file   Lifestyle     Physical activity     Days per week: Not on file     Minutes per session: Not on file     Stress: Not on file   Relationships     Social connections     Talks on phone: Not on file     Gets together: Not on file     Attends Hinduism  service: Not on file     Active member of club or organization: Not on file     Attends meetings of clubs or organizations: Not on file     Relationship status: Not on file     Intimate partner violence     Fear of current or ex partner: Not on file     Emotionally abused: Not on file     Physically abused: Not on file     Forced sexual activity: Not on file   Other Topics Concern     Not on file   Social History Narrative    .  Three children.  Retired.    Preload  6/27/2013     Family History   Problem Relation Age of Onset     Heart Disease Father         Heart Disease     Cancer Sister         Cancer,head/neck     Heart Disease Brother         Heart Disease     Other - See Comments Sister         Irregular heart rhythm     Heart Disease Sister         Heart Disease     Other - See Comments Sister         MVA     Other - See Comments Brother         Multple Sclerosis     Family History Negative Brother         Good Health     Latex, Lisinopril, Penicillins, and Sulfa drugs    Skilled Nursing Facility Medication Record reviewed    End of Life Planning:   DNR/DNI    Review of Systems:  Review of Systems   Constitutional: Positive for appetite change and unexpected weight change. Negative for activity change and fever.   HENT: Negative for mouth sores and trouble swallowing.    Respiratory: Negative for chest tightness, shortness of breath and wheezing.    Cardiovascular: Negative for chest pain, palpitations and peripheral edema.   Gastrointestinal: Negative for abdominal pain, diarrhea, heartburn, hematochezia and vomiting.   Endocrine: Negative for cold intolerance and heat intolerance.   Genitourinary: Negative for difficulty urinating and hematuria.   Musculoskeletal: Positive for gait problem. Negative for arthralgias.   Skin: Negative for wound.   Neurological: Positive for weakness. Negative for dizziness and syncope.   Hematological: Negative for adenopathy.   Psychiatric/Behavioral: Positive for  confusion. Negative for behavioral problems.       Objective:   BP (!) 141/80   Pulse 66   Temp 97.1  F (36.2  C)   Resp 18   Wt 52.6 kg (116 lb)   Physical Exam  Pleasantly demented female lying in bed no acute distress.  Easily awakens and no behaviors.  Nonverbal.  Skin color pink.  Sclera nonicteric.  Mucous membranes moist.  Neck supple without adenopathy.  No thyromegaly.  Lung fields clear to auscultation throughout.  Cardiovascular irregular with controlled rate.  Abdomen soft and without masses, tenderness and organomegaly.  Is without edema.  Exposed skin without rashes.    Assessment:    ICD-10-CM    1. Late onset Alzheimer's disease without behavioral disturbance (H)  G30.1     F02.80    2. Acquired hypothyroidism  E03.9    3. Permanent atrial fibrillation (H)  I48.21    4. Essential hypertension  I10    5. Recurrent major depressive disorder, in remission (H)  F33.40    6. Cerebral infarction, unspecified mechanism (H)  I63.9    7. Other drug-induced neutropenia (H)  D70.2    8. Weight loss  R63.4        Plan:   1.  Continue to follow with psychiatry.  2.  Recent thyroid labs normal.  Continue with Synthroid.  3.  Chronic atrial fibrillation with history of CVA currently only treated with aspirin due to fall risk.  4.  Hypertension well controlled.  Skilled nursing facility vital sign log reviewed.  5.  Continue Remeron 15 mg at bedtime.  6.  Add Prosource 1 ounce daily and check albumin, total protein and CBC next lab day.    Yudith Rubio, NATHALIE   5/5/2021  3:14 PM

## 2021-05-18 ENCOUNTER — MEDICAL CORRESPONDENCE (OUTPATIENT)
Dept: HEALTH INFORMATION MANAGEMENT | Facility: OTHER | Age: 86
End: 2021-05-18

## 2021-05-18 ENCOUNTER — RESULTS ONLY (OUTPATIENT)
Dept: LAB | Age: 86
End: 2021-05-18

## 2021-05-18 ENCOUNTER — APPOINTMENT (OUTPATIENT)
Dept: LAB | Facility: OTHER | Age: 86
End: 2021-05-18
Attending: FAMILY MEDICINE
Payer: COMMERCIAL

## 2021-05-18 LAB
ERYTHROCYTE [DISTWIDTH] IN BLOOD BY AUTOMATED COUNT: 14.6 % (ref 10–15)
HCT VFR BLD AUTO: 41.2 % (ref 35–47)
HGB BLD-MCNC: 13.7 G/DL (ref 11.7–15.7)
MCH RBC QN AUTO: 31.4 PG (ref 26.5–33)
MCHC RBC AUTO-ENTMCNC: 33.3 G/DL (ref 31.5–36.5)
MCV RBC AUTO: 94 FL (ref 78–100)
PLATELET # BLD AUTO: 162 10E9/L (ref 150–450)
PROT SERPL-MCNC: 6.2 G/DL (ref 6.4–8.9)
RBC # BLD AUTO: 4.37 10E12/L (ref 3.8–5.2)
WBC # BLD AUTO: 3.4 10E9/L (ref 4–11)

## 2021-06-01 ENCOUNTER — APPOINTMENT (OUTPATIENT)
Dept: LAB | Facility: OTHER | Age: 86
End: 2021-06-01
Attending: NURSE PRACTITIONER
Payer: COMMERCIAL

## 2021-06-01 ENCOUNTER — RESULTS ONLY (OUTPATIENT)
Dept: LAB | Age: 86
End: 2021-06-01

## 2021-06-01 ENCOUNTER — MEDICAL CORRESPONDENCE (OUTPATIENT)
Dept: HEALTH INFORMATION MANAGEMENT | Facility: OTHER | Age: 86
End: 2021-06-01

## 2021-06-01 LAB
ERYTHROCYTE [DISTWIDTH] IN BLOOD BY AUTOMATED COUNT: 14.6 % (ref 10–15)
HCT VFR BLD AUTO: 40.5 % (ref 35–47)
HGB BLD-MCNC: 13.4 G/DL (ref 11.7–15.7)
MCH RBC QN AUTO: 31.5 PG (ref 26.5–33)
MCHC RBC AUTO-ENTMCNC: 33.1 G/DL (ref 31.5–36.5)
MCV RBC AUTO: 95 FL (ref 78–100)
PLATELET # BLD AUTO: 102 10E9/L (ref 150–450)
RBC # BLD AUTO: 4.26 10E12/L (ref 3.8–5.2)
WBC # BLD AUTO: 2.2 10E9/L (ref 4–11)

## 2021-06-02 ENCOUNTER — NURSING HOME VISIT (OUTPATIENT)
Dept: INTERNAL MEDICINE | Facility: OTHER | Age: 86
End: 2021-06-02
Payer: COMMERCIAL

## 2021-06-02 VITALS
WEIGHT: 109 LBS | DIASTOLIC BLOOD PRESSURE: 63 MMHG | HEART RATE: 64 BPM | SYSTOLIC BLOOD PRESSURE: 120 MMHG | TEMPERATURE: 97.1 F

## 2021-06-02 DIAGNOSIS — G30.1 LATE ONSET ALZHEIMER'S DISEASE WITHOUT BEHAVIORAL DISTURBANCE (H): Primary | ICD-10-CM

## 2021-06-02 DIAGNOSIS — E03.9 ACQUIRED HYPOTHYROIDISM: ICD-10-CM

## 2021-06-02 DIAGNOSIS — R63.4 WEIGHT LOSS: ICD-10-CM

## 2021-06-02 DIAGNOSIS — F02.80 LATE ONSET ALZHEIMER'S DISEASE WITHOUT BEHAVIORAL DISTURBANCE (H): Primary | ICD-10-CM

## 2021-06-02 PROCEDURE — 99308 SBSQ NF CARE LOW MDM 20: CPT | Performed by: NURSE PRACTITIONER

## 2021-06-02 ASSESSMENT — ENCOUNTER SYMPTOMS
VOMITING: 0
APPETITE CHANGE: 1
ACTIVITY CHANGE: 0
DIARRHEA: 0
SPEECH DIFFICULTY: 1
FATIGUE: 0
UNEXPECTED WEIGHT CHANGE: 1
AGITATION: 0

## 2021-06-02 NOTE — PROGRESS NOTES
Subjective:  Patient is seen today for follow-up.  She has had a persistent weight loss.  She is now down 15 pounds since January.  She is on Remeron 15 mg daily to help with sleep and appetite.  She is followed by psychiatry.  She also has hypothyroidism and is on replacement therapy.  Most recent TSH and free T4 levels in March were normal.  She has had a waiting white blood cell count ranging from 2.2-3.4.  She has not been ill.  Nursing staff reports that she has not had any behaviors and appetite actually is improving.  She otherwise is in her usual state of health.  She has severe Alzheimer's dementia with current DNR/DNI status.    Patient Active Problem List   Diagnosis     Acquired hypothyroidism     Alzheimer's disease (H)     Atrial fibrillation (H)     Cerebral infarction (H)     Hypertension     Transient cerebral ischemia     Mild protein-calorie malnutrition (H)     Recurrent major depressive disorder, in remission (H)     Acute cystitis without hematuria     Past Medical History:   Diagnosis Date     Agoraphobia     No Comments Provided     Anxiety disorder     No Comments Provided     Atrial fibrillation (H)     12/28/2012     Autoimmune thyroiditis     No Comments Provided     Bursopathy     4/9/2012     Carcinoma in situ     removed from upper back     Cerebral infarction (H)     12/2/2012,Acute Infarct, posterior left parietal region     Chronic kidney disease, stage III (moderate) (H)     No Comments Provided     Diffuse cystic mastopathy of breast     No Comments Provided     Disorder of cartilage     No Comments Provided     Essential (primary) hypertension     No Comments Provided     Ganglion     6/7/2012     Hypothyroidism     No Comments Provided     Noninfective gastroenteritis and colitis     No Comments Provided     Noninfective gastroenteritis and colitis     Microscopic colitis     Obesity     No Comments Provided     Pneumonitis due to inhalation of food and vomit (H)     1/14/2014      Pure hypercholesterolemia     No Comments Provided     Synovial cyst of popliteal space     9/6/2011     Past Surgical History:   Procedure Laterality Date     ARTHROSCOPY KNEE      09/04,Status post left knee arthroscopy     BIOPSY BREAST      Breast biopsy x 2, both benign     COLONOSCOPY      1999     EXTRACAPSULAR CATARACT EXTRATION WITH INTRAOCULAR LENS IMPLANT      2009,Bilateral cataract surgery     LAPAROSCOPIC TUBAL LIGATION      No Comments Provided     RELEASE CARPAL TUNNEL      2011,Right carpal tunnel release and trigger fingers x 2     Social History     Socioeconomic History     Marital status:      Spouse name: Not on file     Number of children: Not on file     Years of education: Not on file     Highest education level: Not on file   Occupational History     Not on file   Social Needs     Financial resource strain: Not on file     Food insecurity     Worry: Not on file     Inability: Not on file     Transportation needs     Medical: Not on file     Non-medical: Not on file   Tobacco Use     Smoking status: Never Smoker     Smokeless tobacco: Never Used   Substance and Sexual Activity     Alcohol use: No     Drug use: Unknown     Types: Other     Comment: Drug use: No     Sexual activity: Not on file   Lifestyle     Physical activity     Days per week: Not on file     Minutes per session: Not on file     Stress: Not on file   Relationships     Social connections     Talks on phone: Not on file     Gets together: Not on file     Attends Shinto service: Not on file     Active member of club or organization: Not on file     Attends meetings of clubs or organizations: Not on file     Relationship status: Not on file     Intimate partner violence     Fear of current or ex partner: Not on file     Emotionally abused: Not on file     Physically abused: Not on file     Forced sexual activity: Not on file   Other Topics Concern     Not on file   Social History Narrative    .  Three  children.  Retired.    Preload  6/27/2013     Family History   Problem Relation Age of Onset     Heart Disease Father         Heart Disease     Cancer Sister         Cancer,head/neck     Heart Disease Brother         Heart Disease     Other - See Comments Sister         Irregular heart rhythm     Heart Disease Sister         Heart Disease     Other - See Comments Sister         MVA     Other - See Comments Brother         Multple Sclerosis     Family History Negative Brother         Good Health     Latex, Lisinopril, Penicillins, and Sulfa drugs    Skilled Nursing Facility Medication Record reviewed    End of Life Planning:   DNR/DNI    Review of Systems:  Review of Systems   Constitutional: Positive for appetite change and unexpected weight change. Negative for activity change and fatigue.   Gastrointestinal: Negative for diarrhea and vomiting.   Neurological: Positive for speech difficulty.   Psychiatric/Behavioral: Negative for agitation and behavioral problems.       Objective:   There were no vitals taken for this visit.  Physical Exam  Pleasant female lying in bed no acute distress.  She is nonverbal.  Appears comfortable.  Severe dementia.  Lung fields clear to auscultation.  Cardiovascular regular.  Abdomen is without palpable masses and tenderness.  Extremities without edema.    Assessment:    ICD-10-CM    1. Late onset Alzheimer's disease without behavioral disturbance (H)  G30.1     F02.80    2. Acquired hypothyroidism  E03.9    3. Weight loss  R63.4        Plan:   1.  Patient with severe Alzheimer's dementia with progressive weight loss.  This very well could be a progression of her disease.  Her behaviors and appetite waxes and wanes.  Recommend nutritional support with supplementation as recommended by dietitian.  We will ask for dietitian to revisit this.  Increasing Remeron likely would not be effective for improving appetite but this could be something discussed with her psychiatrist.  2.  Low WBC  fluctuates between low and normal.  She is not on any medications currently that could be contributing of this.  She has not had any acute illnesses.  She is not on any medications currently that could be affecting WBC. No reversible cause identified.  Patient DNR/DNI therefore would not recommend additional work-up or following due to her advanced severe dementia.    NATHALIE Ashton   6/2/2021  2:28 PM

## 2021-07-07 ENCOUNTER — NURSING HOME VISIT (OUTPATIENT)
Dept: GERIATRICS | Facility: OTHER | Age: 86
End: 2021-07-07
Payer: COMMERCIAL

## 2021-07-07 VITALS
HEART RATE: 64 BPM | RESPIRATION RATE: 16 BRPM | WEIGHT: 108 LBS | OXYGEN SATURATION: 95 % | SYSTOLIC BLOOD PRESSURE: 92 MMHG | TEMPERATURE: 97.8 F | DIASTOLIC BLOOD PRESSURE: 55 MMHG

## 2021-07-07 DIAGNOSIS — G30.1 LATE ONSET ALZHEIMER'S DISEASE WITHOUT BEHAVIORAL DISTURBANCE (H): Primary | ICD-10-CM

## 2021-07-07 DIAGNOSIS — F02.80 LATE ONSET ALZHEIMER'S DISEASE WITHOUT BEHAVIORAL DISTURBANCE (H): Primary | ICD-10-CM

## 2021-07-07 DIAGNOSIS — E03.9 ACQUIRED HYPOTHYROIDISM: ICD-10-CM

## 2021-07-07 DIAGNOSIS — F33.40 RECURRENT MAJOR DEPRESSIVE DISORDER, IN REMISSION (H): ICD-10-CM

## 2021-07-07 DIAGNOSIS — R63.4 WEIGHT LOSS: ICD-10-CM

## 2021-07-07 PROCEDURE — 99309 SBSQ NF CARE MODERATE MDM 30: CPT | Performed by: NURSE PRACTITIONER

## 2021-07-07 ASSESSMENT — ENCOUNTER SYMPTOMS
SHORTNESS OF BREATH: 0
ABDOMINAL PAIN: 0
CHEST TIGHTNESS: 0
UNEXPECTED WEIGHT CHANGE: 0
VOMITING: 0
CONSTIPATION: 0
FREQUENCY: 0
DIFFICULTY URINATING: 0
CONFUSION: 1
DIARRHEA: 0

## 2021-07-07 NOTE — PROGRESS NOTES
Subjective:  Patient is seen today for 60-day recertification.  She was seen 1 month ago for follow-up on weight.  She had had recent normal thyroid labs.  She currently is on Remeron.  She is being followed by dietitian.  Since last visit her weight is stable at 108 pounds.  She is followed by psychiatry for depression.  Overall staff reports that she is stable at her baseline.    Patient Active Problem List   Diagnosis     Acquired hypothyroidism     Alzheimer's disease (H)     Atrial fibrillation (H)     Cerebral infarction (H)     Hypertension     Transient cerebral ischemia     Mild protein-calorie malnutrition (H)     Recurrent major depressive disorder, in remission (H)     Acute cystitis without hematuria     Past Medical History:   Diagnosis Date     Agoraphobia     No Comments Provided     Anxiety disorder     No Comments Provided     Atrial fibrillation (H)     12/28/2012     Autoimmune thyroiditis     No Comments Provided     Bursopathy     4/9/2012     Carcinoma in situ     removed from upper back     Cerebral infarction (H)     12/2/2012,Acute Infarct, posterior left parietal region     Chronic kidney disease, stage III (moderate) (H)     No Comments Provided     Diffuse cystic mastopathy of breast     No Comments Provided     Disorder of cartilage     No Comments Provided     Essential (primary) hypertension     No Comments Provided     Ganglion     6/7/2012     Hypothyroidism     No Comments Provided     Noninfective gastroenteritis and colitis     No Comments Provided     Noninfective gastroenteritis and colitis     Microscopic colitis     Obesity     No Comments Provided     Pneumonitis due to inhalation of food and vomit (H)     1/14/2014     Pure hypercholesterolemia     No Comments Provided     Synovial cyst of popliteal space     9/6/2011     Past Surgical History:   Procedure Laterality Date     ARTHROSCOPY KNEE      09/04,Status post left knee arthroscopy     BIOPSY BREAST      Breast biopsy  x 2, both benign     COLONOSCOPY      1999     EXTRACAPSULAR CATARACT EXTRATION WITH INTRAOCULAR LENS IMPLANT      2009,Bilateral cataract surgery     LAPAROSCOPIC TUBAL LIGATION      No Comments Provided     RELEASE CARPAL TUNNEL      2011,Right carpal tunnel release and trigger fingers x 2     Social History     Socioeconomic History     Marital status:      Spouse name: Not on file     Number of children: Not on file     Years of education: Not on file     Highest education level: Not on file   Occupational History     Not on file   Social Needs     Financial resource strain: Not on file     Food insecurity     Worry: Not on file     Inability: Not on file     Transportation needs     Medical: Not on file     Non-medical: Not on file   Tobacco Use     Smoking status: Never Smoker     Smokeless tobacco: Never Used   Substance and Sexual Activity     Alcohol use: No     Drug use: Unknown     Types: Other     Comment: Drug use: No     Sexual activity: Not on file   Lifestyle     Physical activity     Days per week: Not on file     Minutes per session: Not on file     Stress: Not on file   Relationships     Social connections     Talks on phone: Not on file     Gets together: Not on file     Attends Mandaeism service: Not on file     Active member of club or organization: Not on file     Attends meetings of clubs or organizations: Not on file     Relationship status: Not on file     Intimate partner violence     Fear of current or ex partner: Not on file     Emotionally abused: Not on file     Physically abused: Not on file     Forced sexual activity: Not on file   Other Topics Concern     Not on file   Social History Narrative    .  Three children.  Retired.    Preload  6/27/2013     Family History   Problem Relation Age of Onset     Heart Disease Father         Heart Disease     Cancer Sister         Cancer,head/neck     Heart Disease Brother         Heart Disease     Other - See Comments Sister          Irregular heart rhythm     Heart Disease Sister         Heart Disease     Other - See Comments Sister         MVA     Other - See Comments Brother         Multple Sclerosis     Family History Negative Brother         Good Health     Latex, Lisinopril, Penicillins, and Sulfa drugs    Skilled Nursing Facility Medication Record reviewed    End of Life Planning:   DNR    Review of Systems:  Review of Systems   Constitutional: Negative for unexpected weight change.   Respiratory: Negative for chest tightness and shortness of breath.    Cardiovascular: Negative for chest pain and peripheral edema.   Gastrointestinal: Negative for abdominal pain, constipation, diarrhea and vomiting.   Genitourinary: Negative for difficulty urinating, frequency and urgency.   Neurological: Negative for syncope.   Psychiatric/Behavioral: Positive for confusion.       Objective:   BP 92/55   Pulse 64   Temp 97.8  F (36.6  C)   Resp 16   Wt 49 kg (108 lb)   SpO2 95%   Physical Exam  Pleasant female no acute distress in usual state of health.  She is nonverbal.  Pleasant and cooperative.  Skin color pink.  Mucous membranes moist.  Neck supple without adenopathy.  Lung fields clear to auscultation.  Cardiovascular regular.  Abdomen is without palpable masses and tenderness.  Extremities without edema.    Assessment:    ICD-10-CM    1. Late onset Alzheimer's disease without behavioral disturbance (H)  G30.1     F02.80    2. Acquired hypothyroidism  E03.9    3. Recurrent major depressive disorder, in remission (H)  F33.40    4. Weight loss  R63.4        Plan:   Weight is stable over the past month.  Recent thyroid labs in March within normal range.  She is followed by psychiatry for Alzheimer's with behaviors and depression and overall this has been stable.  No medication changes or lab work needed at this time.    NATHALIE Ashton   7/7/2021  2:57 PM

## 2021-08-26 ENCOUNTER — TELEPHONE (OUTPATIENT)
Dept: FAMILY MEDICINE | Facility: OTHER | Age: 86
End: 2021-08-26

## 2021-08-26 DIAGNOSIS — L03.90 CELLULITIS, UNSPECIFIED CELLULITIS SITE: Primary | ICD-10-CM

## 2021-08-26 NOTE — TELEPHONE ENCOUNTER
Call them, if they think it is an infection, start Keflex 500 milligram three times a day for 10 days until can be seen.  French Katz MD on 8/26/2021 at 1:04 PM

## 2021-08-26 NOTE — TELEPHONE ENCOUNTER
Patient is at [facility]. Last night patient developed a red spot, warm tender to the touch on her LT lower leg. Should patient be seen? TJP is not available today. Please call Samantha at [facility].     Sanjuana Jacobs on 8/26/2021 at 9:01 AM

## 2021-08-26 NOTE — TELEPHONE ENCOUNTER
Nursing home facility called returned, verification of patient name and . Nurse describes area of concern as dollar bill size, wraps around leg. Noticed yesterday, not gotten any bigger since initial noticed but not improved, marked with sharpie for perimeter. Area is warm and tender to touch, redness. Patient is non verbal but does express discomfort when area in touched. Last temperature taken 2021, 97.2 F. Patient has no other noticeable symptoms of concern. Area of left leg does not have any noticeable wounds or skin issues, no known trauma to area or injury as of recent. Nurse desires to monitor area until advisement of PCP Dr. Katz. Yudith ANTON-FNP no available to see patient in facility until , Monday. Facility does not desire to bring in patient to be evaluated in [facility] due to disorientation concerns. Advised to have patient evaluated in ED with worsening symptoms in interm. Dr. Katz to address 2021, nurse aware of absence from office on this date. No further questions at this time. Evelia Reynolds RN ....................  2021   10:58 AM

## 2021-08-27 ENCOUNTER — TELEPHONE (OUTPATIENT)
Dept: FAMILY MEDICINE | Facility: OTHER | Age: 86
End: 2021-08-27

## 2021-08-27 RX ORDER — CEPHALEXIN 500 MG/1
500 CAPSULE ORAL 3 TIMES DAILY
Qty: 30 CAPSULE | Refills: 0 | Status: SHIPPED | OUTPATIENT
Start: 2021-08-27 | End: 2022-01-01

## 2021-08-27 NOTE — TELEPHONE ENCOUNTER
Sonja called back to let you know that pt was on the medication and had no reaction, checking to see if should continue with that.  Please call    She added her cell number.    Rain Saucedo on 8/27/2021 at 1:41 PM

## 2021-08-27 NOTE — TELEPHONE ENCOUNTER
Should take this one then as long as tolerated it before.  French Katz MD on 8/27/2021 at 4:51 PM

## 2021-08-27 NOTE — TELEPHONE ENCOUNTER
We have a pcn allergy listed, extremely low risk for this to trigger on the Keflex I prescribed. Get more details on their end- what med and what is the reaction.   French Katz MD on 8/27/2021 at 12:35 PM

## 2021-08-30 NOTE — TELEPHONE ENCOUNTER
Called and caught Sonja on person cell number. She was done for the day will call back tomorrow. Ana Lilia Brink LPN .......................8/30/2021  4:33 PM

## 2021-09-01 NOTE — TELEPHONE ENCOUNTER
Sonja at [facility] notified of doctor's response and she says she is doing well and not having any problems so far.  Kacy Martinez [facility])..................9/1/2021   2:33 PM

## 2021-09-02 ENCOUNTER — NURSING HOME VISIT (OUTPATIENT)
Dept: GERIATRICS | Facility: OTHER | Age: 86
End: 2021-09-02
Payer: COMMERCIAL

## 2021-09-02 ENCOUNTER — APPOINTMENT (OUTPATIENT)
Dept: GERIATRICS | Facility: OTHER | Age: 86
End: 2021-09-02
Attending: NURSE PRACTITIONER
Payer: COMMERCIAL

## 2021-09-02 VITALS
DIASTOLIC BLOOD PRESSURE: 70 MMHG | HEART RATE: 88 BPM | WEIGHT: 108 LBS | TEMPERATURE: 97.8 F | OXYGEN SATURATION: 93 % | SYSTOLIC BLOOD PRESSURE: 130 MMHG | RESPIRATION RATE: 18 BRPM

## 2021-09-02 DIAGNOSIS — F02.80 LATE ONSET ALZHEIMER'S DISEASE WITHOUT BEHAVIORAL DISTURBANCE (H): Primary | ICD-10-CM

## 2021-09-02 DIAGNOSIS — I48.21 PERMANENT ATRIAL FIBRILLATION (H): ICD-10-CM

## 2021-09-02 DIAGNOSIS — L03.116 CELLULITIS OF LEFT LOWER EXTREMITY: ICD-10-CM

## 2021-09-02 DIAGNOSIS — F33.40 RECURRENT MAJOR DEPRESSIVE DISORDER, IN REMISSION (H): ICD-10-CM

## 2021-09-02 DIAGNOSIS — N18.31 STAGE 3A CHRONIC KIDNEY DISEASE (H): ICD-10-CM

## 2021-09-02 DIAGNOSIS — I10 ESSENTIAL HYPERTENSION: ICD-10-CM

## 2021-09-02 DIAGNOSIS — E03.9 ACQUIRED HYPOTHYROIDISM: ICD-10-CM

## 2021-09-02 DIAGNOSIS — G30.1 LATE ONSET ALZHEIMER'S DISEASE WITHOUT BEHAVIORAL DISTURBANCE (H): Primary | ICD-10-CM

## 2021-09-02 PROBLEM — N18.30 CHRONIC KIDNEY DISEASE, STAGE 3 (H): Status: ACTIVE | Noted: 2021-09-02

## 2021-09-02 PROCEDURE — 99309 SBSQ NF CARE MODERATE MDM 30: CPT | Performed by: NURSE PRACTITIONER

## 2021-09-02 ASSESSMENT — ENCOUNTER SYMPTOMS
FREQUENCY: 0
DIFFICULTY URINATING: 0
SHORTNESS OF BREATH: 0
VOMITING: 0
DIARRHEA: 0
CHEST TIGHTNESS: 0
CONFUSION: 1
CONSTIPATION: 0
ABDOMINAL PAIN: 0
UNEXPECTED WEIGHT CHANGE: 0

## 2021-09-02 NOTE — PROGRESS NOTES
Subjective:  Patient is seen today for 60-day recertification.   She has advanced dementia without current behaviors.  Depression has been stable, treated with Remeron.  She is followed by facility psychiatry.  Has hypertension, atrial fibrillation and stage III chronic kidney disease.  She is not anticoagulated due to advanced age and fall risk.  Blood pressures well controlled.  Treated for hypothyroidism with Synthroid with recent thyroid labs normal.  On August 26, phone call was placed to on-call MD in regards to a red area on patient's left leg.  She was started on Keflex.  This was for a 10-day course.  Area of redness has resolved.    Patient Active Problem List   Diagnosis     Acquired hypothyroidism     Alzheimer's disease (H)     Atrial fibrillation (H)     Cerebral infarction (H)     Hypertension     Transient cerebral ischemia     Mild protein-calorie malnutrition (H)     Recurrent major depressive disorder, in remission (H)     Acute cystitis without hematuria     Chronic kidney disease, stage 3     Past Medical History:   Diagnosis Date     Agoraphobia     No Comments Provided     Anxiety disorder     No Comments Provided     Atrial fibrillation (H)     12/28/2012     Autoimmune thyroiditis     No Comments Provided     Bursopathy     4/9/2012     Carcinoma in situ     removed from upper back     Cerebral infarction (H)     12/2/2012,Acute Infarct, posterior left parietal region     Chronic kidney disease, stage III (moderate) (H)     No Comments Provided     Diffuse cystic mastopathy of breast     No Comments Provided     Disorder of cartilage     No Comments Provided     Essential (primary) hypertension     No Comments Provided     Ganglion     6/7/2012     Hypothyroidism     No Comments Provided     Noninfective gastroenteritis and colitis     No Comments Provided     Noninfective gastroenteritis and colitis     Microscopic colitis     Obesity     No Comments Provided     Pneumonitis due to  inhalation of food and vomit (H)     1/14/2014     Pure hypercholesterolemia     No Comments Provided     Synovial cyst of popliteal space     9/6/2011     Past Surgical History:   Procedure Laterality Date     ARTHROSCOPY KNEE      09/04,Status post left knee arthroscopy     BIOPSY BREAST      Breast biopsy x 2, both benign     COLONOSCOPY      1999     EXTRACAPSULAR CATARACT EXTRATION WITH INTRAOCULAR LENS IMPLANT      2009,Bilateral cataract surgery     LAPAROSCOPIC TUBAL LIGATION      No Comments Provided     RELEASE CARPAL TUNNEL      2011,Right carpal tunnel release and trigger fingers x 2     Social History     Socioeconomic History     Marital status:      Spouse name: Not on file     Number of children: Not on file     Years of education: Not on file     Highest education level: Not on file   Occupational History     Not on file   Tobacco Use     Smoking status: Never Smoker     Smokeless tobacco: Never Used   Substance and Sexual Activity     Alcohol use: No     Drug use: Unknown     Types: Other     Comment: Drug use: No     Sexual activity: Not on file   Other Topics Concern     Not on file   Social History Narrative    .  Three children.  Retired.    Preload  6/27/2013     Social Determinants of Health     Financial Resource Strain:      Difficulty of Paying Living Expenses:    Food Insecurity:      Worried About Running Out of Food in the Last Year:      Ran Out of Food in the Last Year:    Transportation Needs:      Lack of Transportation (Medical):      Lack of Transportation (Non-Medical):    Physical Activity:      Days of Exercise per Week:      Minutes of Exercise per Session:    Stress:      Feeling of Stress :    Social Connections:      Frequency of Communication with Friends and Family:      Frequency of Social Gatherings with Friends and Family:      Attends Pentecostalism Services:      Active Member of Clubs or Organizations:      Attends Club or Organization Meetings:       Marital Status:    Intimate Partner Violence:      Fear of Current or Ex-Partner:      Emotionally Abused:      Physically Abused:      Sexually Abused:      Family History   Problem Relation Age of Onset     Heart Disease Father         Heart Disease     Cancer Sister         Cancer,head/neck     Heart Disease Brother         Heart Disease     Other - See Comments Sister         Irregular heart rhythm     Heart Disease Sister         Heart Disease     Other - See Comments Sister         MVA     Other - See Comments Brother         Multple Sclerosis     Family History Negative Brother         Good Health     Latex, Lisinopril, Penicillins, and Sulfa drugs    Skilled Nursing Facility Medication Record reviewed    End of Life Planning:   DNR    Review of Systems:  Review of Systems   Constitutional: Negative for unexpected weight change.   Respiratory: Negative for chest tightness and shortness of breath.    Cardiovascular: Negative for chest pain and peripheral edema.   Gastrointestinal: Negative for abdominal pain, constipation, diarrhea and vomiting.   Genitourinary: Negative for difficulty urinating, frequency and urgency.   Neurological: Negative for syncope.   Psychiatric/Behavioral: Positive for confusion.       Objective:   /70   Pulse 88   Temp 97.8  F (36.6  C)   Resp 18   Wt 49 kg (108 lb)   SpO2 93%   Physical Exam  Pleasant female lying in bed no acute distress.  Nonverbal.  Severe dementia.  Skin color pink.  Sclera nonicteric.  Mucous remains moist.  Neck supple and without adenopathy.  No thyromegaly.  Lung fields clear to auscultation.  Cardiovascular regular, no S3.  Abdomen soft and without masses, tenderness and organomegaly.  Extremities without edema.    Assessment:    ICD-10-CM    1. Late onset Alzheimer's disease without behavioral disturbance (H)  G30.1     F02.80    2. Acquired hypothyroidism  E03.9    3. Permanent atrial fibrillation (H)  I48.21    4. Essential hypertension  I10     5. Stage 3a chronic kidney disease  N18.31    6. Recurrent major depressive disorder, in remission (H)  F33.40    7. Cellulitis of left lower extremity  L03.116        Plan:   Continue current medication and treatment plan.  Continue to follow with psychiatry.  Initial course of antibiotics.  No evidence of cellulitis currently.

## 2021-10-25 ENCOUNTER — NURSING HOME VISIT (OUTPATIENT)
Dept: GERIATRICS | Facility: OTHER | Age: 86
End: 2021-10-25
Payer: COMMERCIAL

## 2021-10-25 VITALS
RESPIRATION RATE: 20 BRPM | SYSTOLIC BLOOD PRESSURE: 100 MMHG | DIASTOLIC BLOOD PRESSURE: 60 MMHG | WEIGHT: 109 LBS | OXYGEN SATURATION: 94 % | HEART RATE: 58 BPM | TEMPERATURE: 97 F

## 2021-10-25 DIAGNOSIS — F33.40 RECURRENT MAJOR DEPRESSIVE DISORDER, IN REMISSION (H): ICD-10-CM

## 2021-10-25 DIAGNOSIS — N18.31 STAGE 3A CHRONIC KIDNEY DISEASE (H): ICD-10-CM

## 2021-10-25 DIAGNOSIS — G30.1 LATE ONSET ALZHEIMER'S DISEASE WITHOUT BEHAVIORAL DISTURBANCE (H): Primary | ICD-10-CM

## 2021-10-25 DIAGNOSIS — I48.21 PERMANENT ATRIAL FIBRILLATION (H): ICD-10-CM

## 2021-10-25 DIAGNOSIS — E03.9 ACQUIRED HYPOTHYROIDISM: ICD-10-CM

## 2021-10-25 DIAGNOSIS — I10 ESSENTIAL HYPERTENSION: ICD-10-CM

## 2021-10-25 DIAGNOSIS — F02.80 LATE ONSET ALZHEIMER'S DISEASE WITHOUT BEHAVIORAL DISTURBANCE (H): Primary | ICD-10-CM

## 2021-10-25 DIAGNOSIS — Z71.89 ADVANCE CARE PLANNING: ICD-10-CM

## 2021-10-25 PROCEDURE — 99309 SBSQ NF CARE MODERATE MDM 30: CPT | Performed by: NURSE PRACTITIONER

## 2021-10-25 ASSESSMENT — ENCOUNTER SYMPTOMS
WHEEZING: 0
CONFUSION: 1
DIARRHEA: 0
UNEXPECTED WEIGHT CHANGE: 0
CHOKING: 0
ANAL BLEEDING: 0
HEMATOCHEZIA: 0
WOUND: 0
SHORTNESS OF BREATH: 0
TROUBLE SWALLOWING: 1
TREMORS: 0
DIFFICULTY URINATING: 0
ARTHRALGIAS: 0
ABDOMINAL PAIN: 0
DIAPHORESIS: 0
COUGH: 0
ABDOMINAL DISTENTION: 0
SPEECH DIFFICULTY: 1
HEMATURIA: 0
VOMITING: 0

## 2021-12-06 NOTE — PROGRESS NOTES
Subjective:  Patient is seen today for COVID-19 infection.  She tested +3 days ago.  She currently is asymptomatic.  She has been vaccinated against COVID-19 including her COVID-19 booster on October 28, 2021.  Other residents on the same wing have tested positive recently for COVID-19.  Patient has severe Alzheimer's dementia and stage III chronic kidney disease.  Last lab draw was March 2021 with a GFR of 55.  She has no significant cardiopulmonary disease and does not require oxygen.      Patient Active Problem List   Diagnosis     Acquired hypothyroidism     Alzheimer's disease (H)     Atrial fibrillation (H)     Cerebral infarction (H)     Hypertension     Transient cerebral ischemia     Mild protein-calorie malnutrition (H)     Recurrent major depressive disorder, in remission (H)     Acute cystitis without hematuria     Chronic kidney disease, stage 3 (H)     Past Medical History:   Diagnosis Date     Agoraphobia     No Comments Provided     Anxiety disorder     No Comments Provided     Atrial fibrillation (H)     12/28/2012     Autoimmune thyroiditis     No Comments Provided     Bursopathy     4/9/2012     Carcinoma in situ     removed from upper back     Cerebral infarction (H)     12/2/2012,Acute Infarct, posterior left parietal region     Chronic kidney disease, stage III (moderate) (H)     No Comments Provided     Diffuse cystic mastopathy of breast     No Comments Provided     Disorder of cartilage     No Comments Provided     Essential (primary) hypertension     No Comments Provided     Ganglion     6/7/2012     Hypothyroidism     No Comments Provided     Noninfective gastroenteritis and colitis     No Comments Provided     Noninfective gastroenteritis and colitis     Microscopic colitis     Obesity     No Comments Provided     Pneumonitis due to inhalation of food and vomit (H)     1/14/2014     Pure hypercholesterolemia     No Comments Provided     Synovial cyst of popliteal space     9/6/2011      Past Surgical History:   Procedure Laterality Date     ARTHROSCOPY KNEE      09/04,Status post left knee arthroscopy     BIOPSY BREAST      Breast biopsy x 2, both benign     COLONOSCOPY      1999     EXTRACAPSULAR CATARACT EXTRATION WITH INTRAOCULAR LENS IMPLANT      2009,Bilateral cataract surgery     LAPAROSCOPIC TUBAL LIGATION      No Comments Provided     RELEASE CARPAL TUNNEL      2011,Right carpal tunnel release and trigger fingers x 2     Social History     Socioeconomic History     Marital status:      Spouse name: Not on file     Number of children: Not on file     Years of education: Not on file     Highest education level: Not on file   Occupational History     Not on file   Tobacco Use     Smoking status: Never Smoker     Smokeless tobacco: Never Used   Substance and Sexual Activity     Alcohol use: No     Drug use: Unknown     Types: Other     Comment: Drug use: No     Sexual activity: Not on file   Other Topics Concern     Not on file   Social History Narrative    .  Three children.  Retired.    Preload  6/27/2013     Social Determinants of Health     Financial Resource Strain: Not on file   Food Insecurity: Not on file   Transportation Needs: Not on file   Physical Activity: Not on file   Stress: Not on file   Social Connections: Not on file   Intimate Partner Violence: Not on file   Housing Stability: Not on file     Family History   Problem Relation Age of Onset     Heart Disease Father         Heart Disease     Cancer Sister         Cancer,head/neck     Heart Disease Brother         Heart Disease     Other - See Comments Sister         Irregular heart rhythm     Heart Disease Sister         Heart Disease     Other - See Comments Sister         MVA     Other - See Comments Brother         Multple Sclerosis     Family History Negative Brother         Good Health     Latex, Lisinopril, Penicillins, and Sulfa drugs    Skilled Nursing Facility Medication Record reviewed    End of Life  Planning:   DNR    Review of Systems:  Review of Systems   Constitutional: Negative for fever.   Respiratory: Negative for cough, shortness of breath, wheezing and stridor.    Cardiovascular: Negative for chest pain.   Gastrointestinal: Negative for diarrhea and vomiting.   Neurological: Negative for weakness.       Objective:   /60   Pulse 58   Temp 98  F (36.7  C)   Resp 18   Wt 49 kg (108 lb)   SpO2 92%   Physical Exam  Pleasant female lying in bed usual state of health.  Severe dementia.  Appears comfortable.  Skin color pale.  Mucous membranes moist.  Lung fields clear to auscultation.  Cardiovascular irregular, controlled rate.  Abdomen with normal bowel sounds.  No tenderness or palpable masses.    Assessment:    ICD-10-CM    1. Infection due to 2019 novel coronavirus  U07.1    2. Late onset Alzheimer's disease without behavioral disturbance (H)  G30.1     F02.80    3. Stage 3a chronic kidney disease (H)  N18.31        Plan:   Patient has asymptomatic COVID-19 infection.  She is in a high risk of multisystem organ failure secondary to advanced age and comorbidities.  She would meet criteria for receiving monoclonal antibody treatment.  Her healthcare POA will need to review monoclonal antibody EUA and if in agreement then may proceed with monoclonal antibodies per treatment protocol.    NATHALIE Ashton   12/6/2021  4:10 PM

## 2021-12-20 NOTE — PROGRESS NOTES
Subjective:  Patient is seen today for 60-day recertification.  She has severe Alzheimer's dementia.  No current behaviors.  Has hypothyroidism that is treated with Synthroid with normal TSH in March 2021.  She has hypertension and stage III chronic kidney disease.  Blood pressures have been well controlled.  She is on her on for depression and also to help manage weight loss.  Her weight has been stable at 109 pounds.  Earlier in December she had COVID-19 infection and was treated with monoclonal antibodies.  Patient currently is asymptomatic of COVID-19.    Patient Active Problem List   Diagnosis     Acquired hypothyroidism     Alzheimer's disease (H)     Atrial fibrillation (H)     Cerebral infarction (H)     Hypertension     Transient cerebral ischemia     Mild protein-calorie malnutrition (H)     Recurrent major depressive disorder, in remission (H)     Acute cystitis without hematuria     Chronic kidney disease, stage 3 (H)     Past Medical History:   Diagnosis Date     Agoraphobia     No Comments Provided     Anxiety disorder     No Comments Provided     Atrial fibrillation (H)     12/28/2012     Autoimmune thyroiditis     No Comments Provided     Bursopathy     4/9/2012     Carcinoma in situ     removed from upper back     Cerebral infarction (H)     12/2/2012,Acute Infarct, posterior left parietal region     Chronic kidney disease, stage III (moderate) (H)     No Comments Provided     Diffuse cystic mastopathy of breast     No Comments Provided     Disorder of cartilage     No Comments Provided     Essential (primary) hypertension     No Comments Provided     Ganglion     6/7/2012     Hypothyroidism     No Comments Provided     Noninfective gastroenteritis and colitis     No Comments Provided     Noninfective gastroenteritis and colitis     Microscopic colitis     Obesity     No Comments Provided     Pneumonitis due to inhalation of food and vomit (H)     1/14/2014     Pure hypercholesterolemia     No  Comments Provided     Synovial cyst of popliteal space     9/6/2011     Past Surgical History:   Procedure Laterality Date     ARTHROSCOPY KNEE      09/04,Status post left knee arthroscopy     BIOPSY BREAST      Breast biopsy x 2, both benign     COLONOSCOPY      1999     EXTRACAPSULAR CATARACT EXTRATION WITH INTRAOCULAR LENS IMPLANT      2009,Bilateral cataract surgery     LAPAROSCOPIC TUBAL LIGATION      No Comments Provided     RELEASE CARPAL TUNNEL      2011,Right carpal tunnel release and trigger fingers x 2     Social History     Socioeconomic History     Marital status:      Spouse name: Not on file     Number of children: Not on file     Years of education: Not on file     Highest education level: Not on file   Occupational History     Not on file   Tobacco Use     Smoking status: Never Smoker     Smokeless tobacco: Never Used   Substance and Sexual Activity     Alcohol use: No     Drug use: Unknown     Types: Other     Comment: Drug use: No     Sexual activity: Not on file   Other Topics Concern     Not on file   Social History Narrative    .  Three children.  Retired.    Preload  6/27/2013     Social Determinants of Health     Financial Resource Strain: Not on file   Food Insecurity: Not on file   Transportation Needs: Not on file   Physical Activity: Not on file   Stress: Not on file   Social Connections: Not on file   Intimate Partner Violence: Not on file   Housing Stability: Not on file     Family History   Problem Relation Age of Onset     Heart Disease Father         Heart Disease     Cancer Sister         Cancer,head/neck     Heart Disease Brother         Heart Disease     Other - See Comments Sister         Irregular heart rhythm     Heart Disease Sister         Heart Disease     Other - See Comments Sister         MVA     Other - See Comments Brother         Multple Sclerosis     Family History Negative Brother         Good Health     Latex, Lisinopril, Penicillins, and Sulfa  drugs    Skilled Nursing Facility Medication Record reviewed    End of Life Planning:   DNR/DNI    Review of Systems:  Review of Systems   Constitutional: Negative for diaphoresis and unexpected weight change.   HENT: Positive for trouble swallowing.         Prescribed mechanical soft diet   Respiratory: Negative for cough and shortness of breath.    Cardiovascular: Negative for chest pain and peripheral edema.   Gastrointestinal: Negative for abdominal distention, abdominal pain, anal bleeding, diarrhea, hematochezia and vomiting.   Genitourinary: Negative for difficulty urinating and hematuria.   Musculoskeletal: Negative for arthralgias.   Skin: Negative for wound.   Neurological: Positive for speech difficulty. Negative for tremors.        No recent falls   Psychiatric/Behavioral: Positive for confusion.       Objective:   BP (!) 150/80   Pulse 86   Temp 97.4  F (36.3  C)   Resp 16   Wt 49.4 kg (109 lb)   SpO2 97%   Physical Exam  Pleasant female no acute distress.  Lying in bed.  Nonverbal.  Severe dementia.  Skin color pink.  Mucous membranes moist.  Neck supple.  No thyromegaly.  Lung fields clear to auscultation.  Cardiovascular irregular, controlled rate.  No S3.  Abdomen soft and without masses, tenderness and organomegaly.  Extremities without edema.    Assessment:    ICD-10-CM    1. Late onset Alzheimer's disease without behavioral disturbance (H)  G30.1     F02.80    2. Acquired hypothyroidism  E03.9    3. Essential hypertension  I10    4. Permanent atrial fibrillation (H)  I48.21    5. Recurrent major depressive disorder, in remission (H)  F33.40    6. Stage 3a chronic kidney disease (H)  N18.31    7. Infection due to 2019 novel coronavirus  U07.1        Plan:   Overall patient doing well.  Recently treated with monoclonal antibodies for COVID-19 infection.  No current symptoms of infection.  Continue same medication and treatment plan.  Labs up-to-date.    Yudith Rubio, NATHALIE   12/20/2021    4:07 PM

## 2022-01-01 ENCOUNTER — LAB REQUISITION (OUTPATIENT)
Dept: LAB | Facility: OTHER | Age: 87
End: 2022-01-01
Payer: COMMERCIAL

## 2022-01-01 ENCOUNTER — NURSING HOME VISIT (OUTPATIENT)
Dept: GERIATRICS | Facility: OTHER | Age: 87
End: 2022-01-01
Payer: COMMERCIAL

## 2022-01-01 ENCOUNTER — MEDICAL CORRESPONDENCE (OUTPATIENT)
Dept: HEALTH INFORMATION MANAGEMENT | Facility: OTHER | Age: 87
End: 2022-01-01

## 2022-01-01 ENCOUNTER — VIRTUAL VISIT (OUTPATIENT)
Dept: INTERNAL MEDICINE | Facility: OTHER | Age: 87
End: 2022-01-01
Attending: NURSE PRACTITIONER
Payer: COMMERCIAL

## 2022-01-01 ENCOUNTER — NURSING HOME VISIT (OUTPATIENT)
Dept: GERIATRICS | Facility: OTHER | Age: 87
End: 2022-01-01
Attending: FAMILY MEDICINE
Payer: COMMERCIAL

## 2022-01-01 ENCOUNTER — DOCUMENTATION ONLY (OUTPATIENT)
Dept: OTHER | Facility: CLINIC | Age: 87
End: 2022-01-01
Payer: COMMERCIAL

## 2022-01-01 ENCOUNTER — APPOINTMENT (OUTPATIENT)
Dept: GERIATRICS | Facility: OTHER | Age: 87
End: 2022-01-01
Attending: NURSE PRACTITIONER
Payer: COMMERCIAL

## 2022-01-01 ENCOUNTER — HOSPITAL ENCOUNTER (EMERGENCY)
Facility: OTHER | Age: 87
Discharge: NURSING, REHAB OR LONG-TERM CARE | End: 2022-02-08
Attending: FAMILY MEDICINE | Admitting: FAMILY MEDICINE
Payer: COMMERCIAL

## 2022-01-01 VITALS
HEART RATE: 64 BPM | SYSTOLIC BLOOD PRESSURE: 117 MMHG | RESPIRATION RATE: 18 BRPM | TEMPERATURE: 98 F | DIASTOLIC BLOOD PRESSURE: 53 MMHG | WEIGHT: 102 LBS | OXYGEN SATURATION: 97 %

## 2022-01-01 VITALS
DIASTOLIC BLOOD PRESSURE: 72 MMHG | WEIGHT: 105.9 LBS | OXYGEN SATURATION: 98 % | TEMPERATURE: 98 F | HEART RATE: 64 BPM | SYSTOLIC BLOOD PRESSURE: 120 MMHG

## 2022-01-01 VITALS
RESPIRATION RATE: 18 BRPM | WEIGHT: 109 LBS | TEMPERATURE: 98 F | HEART RATE: 52 BPM | DIASTOLIC BLOOD PRESSURE: 52 MMHG | SYSTOLIC BLOOD PRESSURE: 143 MMHG

## 2022-01-01 VITALS
DIASTOLIC BLOOD PRESSURE: 97 MMHG | OXYGEN SATURATION: 92 % | HEART RATE: 76 BPM | SYSTOLIC BLOOD PRESSURE: 124 MMHG | RESPIRATION RATE: 18 BRPM | TEMPERATURE: 96.6 F

## 2022-01-01 VITALS
RESPIRATION RATE: 18 BRPM | SYSTOLIC BLOOD PRESSURE: 139 MMHG | TEMPERATURE: 96.8 F | WEIGHT: 104 LBS | HEART RATE: 99 BPM | DIASTOLIC BLOOD PRESSURE: 74 MMHG

## 2022-01-01 VITALS — DIASTOLIC BLOOD PRESSURE: 48 MMHG | SYSTOLIC BLOOD PRESSURE: 115 MMHG | HEART RATE: 61 BPM | TEMPERATURE: 97.1 F

## 2022-01-01 VITALS
SYSTOLIC BLOOD PRESSURE: 143 MMHG | TEMPERATURE: 97.5 F | WEIGHT: 106 LBS | DIASTOLIC BLOOD PRESSURE: 75 MMHG | HEART RATE: 60 BPM | RESPIRATION RATE: 18 BRPM

## 2022-01-01 DIAGNOSIS — F33.40 RECURRENT MAJOR DEPRESSIVE DISORDER, IN REMISSION (H): ICD-10-CM

## 2022-01-01 DIAGNOSIS — I10 PRIMARY HYPERTENSION: ICD-10-CM

## 2022-01-01 DIAGNOSIS — S10.93XA CONTUSION OF FACE, SCALP AND NECK, INITIAL ENCOUNTER: ICD-10-CM

## 2022-01-01 DIAGNOSIS — G30.9 ALZHEIMER'S DISEASE (H): Primary | ICD-10-CM

## 2022-01-01 DIAGNOSIS — Z66 DNR (DO NOT RESUSCITATE): ICD-10-CM

## 2022-01-01 DIAGNOSIS — F02.80 ALZHEIMER'S DISEASE (H): Primary | ICD-10-CM

## 2022-01-01 DIAGNOSIS — N18.31 STAGE 3A CHRONIC KIDNEY DISEASE (H): ICD-10-CM

## 2022-01-01 DIAGNOSIS — G30.1 LATE ONSET ALZHEIMER'S DISEASE WITHOUT BEHAVIORAL DISTURBANCE (H): ICD-10-CM

## 2022-01-01 DIAGNOSIS — N39.498 OTHER URINARY INCONTINENCE: ICD-10-CM

## 2022-01-01 DIAGNOSIS — E03.9 ACQUIRED HYPOTHYROIDISM: ICD-10-CM

## 2022-01-01 DIAGNOSIS — S09.90XA CLOSED HEAD INJURY, INITIAL ENCOUNTER: ICD-10-CM

## 2022-01-01 DIAGNOSIS — Z87.440 PERSONAL HISTORY OF URINARY (TRACT) INFECTIONS: ICD-10-CM

## 2022-01-01 DIAGNOSIS — I10 ESSENTIAL HYPERTENSION: ICD-10-CM

## 2022-01-01 DIAGNOSIS — I48.21 PERMANENT ATRIAL FIBRILLATION (H): ICD-10-CM

## 2022-01-01 DIAGNOSIS — S00.83XA CONTUSION OF FACE, SCALP AND NECK, INITIAL ENCOUNTER: ICD-10-CM

## 2022-01-01 DIAGNOSIS — F02.80 LATE ONSET ALZHEIMER'S DISEASE WITHOUT BEHAVIORAL DISTURBANCE (H): Primary | ICD-10-CM

## 2022-01-01 DIAGNOSIS — S00.03XA CONTUSION OF FACE, SCALP AND NECK, INITIAL ENCOUNTER: ICD-10-CM

## 2022-01-01 DIAGNOSIS — Z00.00 MEDICARE ANNUAL WELLNESS VISIT, SUBSEQUENT: Primary | ICD-10-CM

## 2022-01-01 DIAGNOSIS — G30.1 LATE ONSET ALZHEIMER'S DISEASE WITHOUT BEHAVIORAL DISTURBANCE (H): Primary | ICD-10-CM

## 2022-01-01 DIAGNOSIS — F91.9 DISRUPTIVE BEHAVIOR DISORDER: ICD-10-CM

## 2022-01-01 DIAGNOSIS — E03.9 HYPOTHYROIDISM, UNSPECIFIED: ICD-10-CM
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
## 2022-01-01 DIAGNOSIS — F02.80 LATE ONSET ALZHEIMER'S DISEASE WITHOUT BEHAVIORAL DISTURBANCE (H): ICD-10-CM

## 2022-01-01 DIAGNOSIS — N30.00 ACUTE CYSTITIS WITHOUT HEMATURIA: Primary | ICD-10-CM

## 2022-01-01 DIAGNOSIS — R19.7 DIARRHEA, UNSPECIFIED: ICD-10-CM

## 2022-01-01 LAB
ALBUMIN UR-MCNC: 10 MG/DL
APPEARANCE UR: ABNORMAL
BACTERIA #/AREA URNS HPF: ABNORMAL /HPF
BACTERIA UR CULT: ABNORMAL
BILIRUB UR QL STRIP: NEGATIVE
C DIFF TOX B STL QL: NEGATIVE
COLOR UR AUTO: YELLOW
GLUCOSE UR STRIP-MCNC: NEGATIVE MG/DL
HGB UR QL STRIP: NEGATIVE
KETONES UR STRIP-MCNC: NEGATIVE MG/DL
LEUKOCYTE ESTERASE UR QL STRIP: ABNORMAL
MUCOUS THREADS #/AREA URNS LPF: PRESENT /LPF
NITRATE UR QL: NEGATIVE
PH UR STRIP: 6 [PH] (ref 5–9)
RBC URINE: <1 /HPF
RIBOTYPE 027/NAP1/BI: NORMAL
SP GR UR STRIP: 1.02 (ref 1–1.03)
SQUAMOUS EPITHELIAL: 10 /HPF
T4 FREE SERPL-MCNC: 0.82 NG/DL (ref 0.6–1.6)
TSH SERPL DL<=0.005 MIU/L-ACNC: 0.62 MU/L (ref 0.4–4)
UROBILINOGEN UR STRIP-MCNC: NORMAL MG/DL
WBC URINE: 54 /HPF

## 2022-01-01 PROCEDURE — 99307 SBSQ NF CARE SF MDM 10: CPT | Performed by: FAMILY MEDICINE

## 2022-01-01 PROCEDURE — 99309 SBSQ NF CARE MODERATE MDM 30: CPT | Performed by: NURSE PRACTITIONER

## 2022-01-01 PROCEDURE — G0439 PPPS, SUBSEQ VISIT: HCPCS | Mod: 95 | Performed by: NURSE PRACTITIONER

## 2022-01-01 PROCEDURE — 87086 URINE CULTURE/COLONY COUNT: CPT | Performed by: NURSE PRACTITIONER

## 2022-01-01 PROCEDURE — 84439 ASSAY OF FREE THYROXINE: CPT | Performed by: NURSE PRACTITIONER

## 2022-01-01 PROCEDURE — 99308 SBSQ NF CARE LOW MDM 20: CPT | Performed by: FAMILY MEDICINE

## 2022-01-01 PROCEDURE — 81001 URINALYSIS AUTO W/SCOPE: CPT | Performed by: NURSE PRACTITIONER

## 2022-01-01 PROCEDURE — 87493 C DIFF AMPLIFIED PROBE: CPT | Performed by: NURSE PRACTITIONER

## 2022-01-01 PROCEDURE — 99282 EMERGENCY DEPT VISIT SF MDM: CPT | Performed by: FAMILY MEDICINE

## 2022-01-01 PROCEDURE — 99308 SBSQ NF CARE LOW MDM 20: CPT | Performed by: NURSE PRACTITIONER

## 2022-01-01 PROCEDURE — 84443 ASSAY THYROID STIM HORMONE: CPT | Performed by: NURSE PRACTITIONER

## 2022-01-01 RX ORDER — CASIRIVIMAB AND IMDEVIMAB 1332-1332
KIT INTRAVENOUS
COMMUNITY
Start: 2021-01-01 | End: 2022-01-01

## 2022-01-01 RX ORDER — MIRTAZAPINE 15 MG/1
15 TABLET, FILM COATED ORAL AT BEDTIME
COMMUNITY
Start: 2022-01-01

## 2022-01-01 RX ORDER — DOCUSATE SODIUM 50 MG AND SENNOSIDES 8.6 MG 8.6; 5 MG/1; MG/1
TABLET, FILM COATED ORAL
COMMUNITY
Start: 2022-01-01

## 2022-01-01 RX ORDER — OSELTAMIVIR PHOSPHATE 30 MG/1
CAPSULE ORAL
COMMUNITY
Start: 2022-01-01 | End: 2022-01-01

## 2022-01-01 RX ORDER — METOPROLOL TARTRATE 25 MG/1
TABLET, FILM COATED ORAL
COMMUNITY
Start: 2022-01-01

## 2022-01-01 RX ORDER — CLINDAMYCIN HCL 300 MG
CAPSULE ORAL
COMMUNITY
Start: 2022-01-01 | End: 2022-01-01

## 2022-01-01 ASSESSMENT — ENCOUNTER SYMPTOMS
HEMATURIA: 0
SHORTNESS OF BREATH: 0
HEMATOCHEZIA: 0
VOMITING: 0
VOMITING: 0
AGITATION: 0
ABDOMINAL DISTENTION: 0
HEMATOCHEZIA: 0
ANAL BLEEDING: 0
VOMITING: 0
FATIGUE: 0
CONFUSION: 1
ARTHRALGIAS: 0
EYE REDNESS: 0
FATIGUE: 0
ARTHRALGIAS: 0
COUGH: 0
COUGH: 0
FEVER: 0
FATIGUE: 0
ANAL BLEEDING: 0
FATIGUE: 0
ARTHRALGIAS: 0
CONFUSION: 1
SHORTNESS OF BREATH: 0
ANAL BLEEDING: 0
CONFUSION: 1
DIFFICULTY URINATING: 0
AGITATION: 0
ABDOMINAL DISTENTION: 0
VOMITING: 0
CHILLS: 0
CHOKING: 0
ABDOMINAL DISTENTION: 0
DIARRHEA: 0
DIFFICULTY URINATING: 0
CONFUSION: 1
COUGH: 0
SLEEP DISTURBANCE: 0
DIFFICULTY URINATING: 0
HEMATOCHEZIA: 0
TROUBLE SWALLOWING: 1
ANAL BLEEDING: 0
COUGH: 0
SHORTNESS OF BREATH: 0
WOUND: 0
AGITATION: 0
ABDOMINAL PAIN: 0
CONSTIPATION: 0
SHORTNESS OF BREATH: 0
DIFFICULTY URINATING: 0

## 2022-01-01 ASSESSMENT — ACTIVITIES OF DAILY LIVING (ADL)
CURRENT_FUNCTION: MEDICATION ADMINISTRATION REQUIRES ASSISTANCE
CURRENT_FUNCTION: MONEY MANAGEMENT REQUIRES ASSISTANCE
CURRENT_FUNCTION: HOUSEWORK REQUIRES ASSISTANCE
CURRENT_FUNCTION: TELEPHONE REQUIRES ASSISTANCE
CURRENT_FUNCTION: LAUNDRY REQUIRES ASSISTANCE
CURRENT_FUNCTION: PREPARING MEALS REQUIRES ASSISTANCE
CURRENT_FUNCTION: BATHING REQUIRES ASSISTANCE
CURRENT_FUNCTION: TRANSPORTATION REQUIRES ASSISTANCE
CURRENT_FUNCTION: SHOPPING REQUIRES ASSISTANCE

## 2022-01-01 ASSESSMENT — ANXIETY QUESTIONNAIRES
IF YOU CHECKED OFF ANY PROBLEMS ON THIS QUESTIONNAIRE, HOW DIFFICULT HAVE THESE PROBLEMS MADE IT FOR YOU TO DO YOUR WORK, TAKE CARE OF THINGS AT HOME, OR GET ALONG WITH OTHER PEOPLE: NOT DIFFICULT AT ALL
1. FEELING NERVOUS, ANXIOUS, OR ON EDGE: NOT AT ALL
3. WORRYING TOO MUCH ABOUT DIFFERENT THINGS: NOT AT ALL
5. BEING SO RESTLESS THAT IT IS HARD TO SIT STILL: NOT AT ALL
7. FEELING AFRAID AS IF SOMETHING AWFUL MIGHT HAPPEN: NOT AT ALL
GAD7 TOTAL SCORE: 0
GAD7 TOTAL SCORE: 0
6. BECOMING EASILY ANNOYED OR IRRITABLE: NOT AT ALL
2. NOT BEING ABLE TO STOP OR CONTROL WORRYING: NOT AT ALL

## 2022-01-01 ASSESSMENT — PATIENT HEALTH QUESTIONNAIRE - PHQ9
5. POOR APPETITE OR OVEREATING: NOT AT ALL
SUM OF ALL RESPONSES TO PHQ QUESTIONS 1-9: 0

## 2022-01-01 ASSESSMENT — PAIN SCALES - GENERAL: PAINLEVEL: NO PAIN (0)

## 2022-01-19 NOTE — PROGRESS NOTES
Subjective:  Resident seen for increase behaviors, urinary incontinence and wandering.  These behaviors are abnormal for her.  She does have dementia but normally does not have any disruptive behaviors nor does she wander.  She is normally continent of urine.  Urinalysis was obtained which shows moderate leukocyte and bacteria, 54 WBCs.  Urine culture results pending.    Patient Active Problem List   Diagnosis     Acquired hypothyroidism     Alzheimer's disease (H)     Atrial fibrillation (H)     Cerebral infarction (H)     Hypertension     Transient cerebral ischemia     Mild protein-calorie malnutrition (H)     Recurrent major depressive disorder, in remission (H)     Acute cystitis without hematuria     Chronic kidney disease, stage 3 (H)     Past Medical History:   Diagnosis Date     Agoraphobia     No Comments Provided     Anxiety disorder     No Comments Provided     Atrial fibrillation (H)     12/28/2012     Autoimmune thyroiditis     No Comments Provided     Bursopathy     4/9/2012     Carcinoma in situ     removed from upper back     Cerebral infarction (H)     12/2/2012,Acute Infarct, posterior left parietal region     Chronic kidney disease, stage III (moderate) (H)     No Comments Provided     Diffuse cystic mastopathy of breast     No Comments Provided     Disorder of cartilage     No Comments Provided     Essential (primary) hypertension     No Comments Provided     Ganglion     6/7/2012     Hypothyroidism     No Comments Provided     Noninfective gastroenteritis and colitis     No Comments Provided     Noninfective gastroenteritis and colitis     Microscopic colitis     Obesity     No Comments Provided     Pneumonitis due to inhalation of food and vomit (H)     1/14/2014     Pure hypercholesterolemia     No Comments Provided     Synovial cyst of popliteal space     9/6/2011     Past Surgical History:   Procedure Laterality Date     ARTHROSCOPY KNEE      09/04,Status post left knee arthroscopy      BIOPSY BREAST      Breast biopsy x 2, both benign     COLONOSCOPY      1999     EXTRACAPSULAR CATARACT EXTRATION WITH INTRAOCULAR LENS IMPLANT      2009,Bilateral cataract surgery     LAPAROSCOPIC TUBAL LIGATION      No Comments Provided     RELEASE CARPAL TUNNEL      2011,Right carpal tunnel release and trigger fingers x 2     Social History     Socioeconomic History     Marital status:      Spouse name: Not on file     Number of children: Not on file     Years of education: Not on file     Highest education level: Not on file   Occupational History     Not on file   Tobacco Use     Smoking status: Never Smoker     Smokeless tobacco: Never Used   Substance and Sexual Activity     Alcohol use: No     Drug use: Unknown     Types: Other     Comment: Drug use: No     Sexual activity: Not on file   Other Topics Concern     Not on file   Social History Narrative    .  Three children.  Retired.    Preload  6/27/2013     Social Determinants of Health     Financial Resource Strain: Not on file   Food Insecurity: Not on file   Transportation Needs: Not on file   Physical Activity: Not on file   Stress: Not on file   Social Connections: Not on file   Intimate Partner Violence: Not on file   Housing Stability: Not on file     Family History   Problem Relation Age of Onset     Heart Disease Father         Heart Disease     Cancer Sister         Cancer,head/neck     Heart Disease Brother         Heart Disease     Other - See Comments Sister         Irregular heart rhythm     Heart Disease Sister         Heart Disease     Other - See Comments Sister         MVA     Other - See Comments Brother         Multple Sclerosis     Family History Negative Brother         Good Health     Latex, Lisinopril, Penicillins, and Sulfa drugs    Skilled Nursing Facility Medication Record reviewed      Review of Systems:  Review of Systems  See hpi    Objective:   /48   Pulse 61   Temp 97.1  F (36.2  C)   Physical  Exam  Pleasantly confused female sitting on the edge of her bed.  Abdomen is soft.  No abdominal tenderness.  No CVA or suprapubic tenderness.  Last GFR 55  Assessment:    ICD-10-CM    1. Acute cystitis without hematuria  N30.00    2. Other urinary incontinence  N39.498    3. Stage 3a chronic kidney disease (H)  N18.31    4. 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  UC results pending, will review and make appropriate changes if needed.  She has allergies to penicillin and sulfa drugs.    NATHALIE Ashton   1/19/2022  4:08 PM

## 2022-02-08 NOTE — ED NOTES
[facility] called to transport pt back, they stated that it will be a couple hours before pt can be transported back.

## 2022-02-08 NOTE — ED TRIAGE NOTES
ED Nursing Triage Note (General)   ________________________________    Lynne Gutierrez is a 88 year old Female that presents to triage ambulance  With history of falling at 0310 today, unwitnessed, pt sent in. Pt noted to be comfort cares, family not notified at this time. Medics report pt is at baseline.   BP (!) 116/103   Temp (!) 96.6  F (35.9  C) (Tympanic)   Resp 16   SpO2 99% t  Patient appears alert , in no acute distress., and cooperative behavior.  Airway: intact  Breathing noted as Normal  Circulation Normal  Skin:  Abnormal - open sores around anus      [facility]: [facility]

## 2022-02-08 NOTE — ED NOTES
Pt was incontinent of stool, while writer and charge nurse were changing pt, noted three open sores around anus, cleaned, and barrier cream applied. Facility notified. Given pt report to Senait CARL at [facility].

## 2022-02-08 NOTE — ED PROVIDER NOTES
History     Chief Complaint   Patient presents with     Fall     HPI  Lynne Gutierrez is a 88 year old female with known dementia on comfort cares  Who presents to the ER from [facility] after a fall at 3 am. Family was not contacted prior to transport.         Allergies:  Allergies   Allergen Reactions     Latex Unknown     Other reaction(s): *Unknown     Lisinopril Unknown     Other reaction(s): *Unknown     Penicillins Unknown     Other reaction(s): *Unknown     Sulfa Drugs Unknown     Other reaction(s): *Unknown       Problem List:    Patient Active Problem List    Diagnosis Date Noted     Chronic kidney disease, stage 3 (H) 09/02/2021     Priority: Medium     Acute cystitis without hematuria 04/05/2021     Priority: Medium     Mild protein-calorie malnutrition (H) 02/19/2018     Priority: Medium     Recurrent major depressive disorder, in remission (H) 02/19/2018     Priority: Medium     Transient cerebral ischemia 12/23/2016     Priority: Medium     Acquired hypothyroidism 04/04/2016     Priority: Medium     Alzheimer's disease (H) 03/29/2013     Priority: Medium     Atrial fibrillation (H) 12/28/2012     Priority: Medium     Cerebral infarction (H) 12/02/2012     Priority: Medium     Overview:   Acute Infarct, posterior left parietal region       Hypertension 12/02/2012     Priority: Medium     Overview:   Acute on chronic          Past Medical History:    Past Medical History:   Diagnosis Date     Agoraphobia      Anxiety disorder      Atrial fibrillation (H)      Autoimmune thyroiditis      Bursopathy      Carcinoma in situ      Cerebral infarction (H)      Chronic kidney disease, stage III (moderate) (H)      Diffuse cystic mastopathy of breast      Disorder of cartilage      Essential (primary) hypertension      Ganglion      Hypothyroidism      Noninfective gastroenteritis and colitis      Noninfective gastroenteritis and colitis      Obesity      Pneumonitis due to inhalation of food and vomit (H)       Pure hypercholesterolemia      Synovial cyst of popliteal space        Past Surgical History:    Past Surgical History:   Procedure Laterality Date     ARTHROSCOPY KNEE      09/04,Status post left knee arthroscopy     BIOPSY BREAST      Breast biopsy x 2, both benign     COLONOSCOPY      1999     EXTRACAPSULAR CATARACT EXTRATION WITH INTRAOCULAR LENS IMPLANT      2009,Bilateral cataract surgery     LAPAROSCOPIC TUBAL LIGATION      No Comments Provided     RELEASE CARPAL TUNNEL      2011,Right carpal tunnel release and trigger fingers x 2       Family History:    Family History   Problem Relation Age of Onset     Heart Disease Father         Heart Disease     Cancer Sister         Cancer,head/neck     Heart Disease Brother         Heart Disease     Other - See Comments Sister         Irregular heart rhythm     Heart Disease Sister         Heart Disease     Other - See Comments Sister         MVA     Other - See Comments Brother         Multple Sclerosis     Family History Negative Brother         Good Health       Social History:  Marital Status:   [2]  Social History     Tobacco Use     Smoking status: Never Smoker     Smokeless tobacco: Never Used   Substance Use Topics     Alcohol use: No     Drug use: Unknown     Types: Other     Comment: Drug use: No        Medications:    aspirin 81 MG chewable tablet  brimonidine-timolol (COMBIGAN) 0.2-0.5 % ophthalmic solution  cephALEXin (KEFLEX) 500 MG capsule  levothyroxine (SYNTHROID/LEVOTHROID) 50 MCG tablet  magnesium hydroxide (MILK OF MAGNESIA) 400 MG/5ML suspension  metoprolol tartrate (LOPRESSOR) 50 MG tablet  mirtazapine (REMERON) 30 MG tablet      Review of Systems   Unable to perform ROS: Dementia       Physical Exam   BP: (!) 116/103  Temp: (!) 96.6  F (35.9  C)  Resp: 16  SpO2: 99 %      Physical Exam  Vitals and nursing note reviewed.   Constitutional:       Comments: Wailing in the room. But does not appear to be in pain.    HENT:      Head:  Normocephalic.      Comments: Bruising and swelling on the left side of her face around the left eye. Palpation of the area does not suggest any fractures. EOM is intact.      Nose: Nose normal.   Eyes:      Extraocular Movements: Extraocular movements intact.      Pupils: Pupils are equal, round, and reactive to light.   Cardiovascular:      Rate and Rhythm: Normal rate. Rhythm irregular.      Heart sounds: Normal heart sounds.   Pulmonary:      Breath sounds: Normal breath sounds.   Abdominal:      General: Abdomen is flat. Bowel sounds are normal.      Palpations: Abdomen is soft.   Musculoskeletal:         General: Normal range of motion.      Cervical back: Normal range of motion. No tenderness.      Comments: No hip or pelvic pain.    Skin:     General: Skin is warm and dry.      Capillary Refill: Capillary refill takes less than 2 seconds.   Neurological:      Mental Status: She is alert.         ED Course   Patient seen and examined.  Discussed patient with both her daughter at 4:52 AM-iSma and her son at [phone] and they both agreed that patient did not need to be in the ER did not need further work-up as they would not want her to have any intervention even if we found something.  I did reassure them that at this point I did not feel that there was any significant injury that needed intervention.  She is going to have significant bruising on the left side of her face and likely will have a black eye tomorrow.  They were comfortable with discharge back to the nursing facility.  Arrangements will be made to bring her back as soon as possible.       No results found for this or any previous visit (from the past 24 hour(s)).    Medications - No data to display    Assessments & Plan (with Medical Decision Making)     I have reviewed the nursing notes.    I have reviewed the findings, diagnosis, plan and need for follow up with the patient.    New Prescriptions    No medications on file       Final diagnoses:    Contusion of face, scalp and neck, initial encounter   Fall, initial encounter       2/8/2022   [facility]     Freida De Oliveira MD  02/08/22 [id number]

## 2022-02-08 NOTE — ED NOTES
Patient helped to bedside toilet.  Small BM, changed and given a breakfast tray.  Ate just a few bites.

## 2022-02-18 NOTE — PROGRESS NOTES
Subjective:  Patient is seen today for 60-day recertification.  She was in the emergency department on February 8, 2022 after she had a fall with facial contusion.  The family was contacted after she was in the emergency department and they did not want any further work-up as patient has severe dementia with comfort focused cares.  She was sent back to the emergency department.  She did have some ecchymosis and swelling but is back to baseline status otherwise.  She has hypertension and stage III chronic kidney disease and atrial fibrillation.  She takes metoprolol tartrate 25 mg daily.  Also has hypothyroidism and taking Synthroid regularly and depression that is stable on Remeron.  She is followed by psychiatry.    Patient Active Problem List   Diagnosis     Acquired hypothyroidism     Alzheimer's disease (H)     Atrial fibrillation (H)     Cerebral infarction (H)     Hypertension     Transient cerebral ischemia     Mild protein-calorie malnutrition (H)     Recurrent major depressive disorder, in remission (H)     Acute cystitis without hematuria     Chronic kidney disease, stage 3 (H)     Past Medical History:   Diagnosis Date     Agoraphobia     No Comments Provided     Anxiety disorder     No Comments Provided     Atrial fibrillation (H)     12/28/2012     Autoimmune thyroiditis     No Comments Provided     Bursopathy     4/9/2012     Carcinoma in situ     removed from upper back     Cerebral infarction (H)     12/2/2012,Acute Infarct, posterior left parietal region     Chronic kidney disease, stage III (moderate) (H)     No Comments Provided     Diffuse cystic mastopathy of breast     No Comments Provided     Disorder of cartilage     No Comments Provided     Essential (primary) hypertension     No Comments Provided     Ganglion     6/7/2012     Hypothyroidism     No Comments Provided     Noninfective gastroenteritis and colitis     No Comments Provided     Noninfective gastroenteritis and colitis      Microscopic colitis     Obesity     No Comments Provided     Pneumonitis due to inhalation of food and vomit (H)     1/14/2014     Pure hypercholesterolemia     No Comments Provided     Synovial cyst of popliteal space     9/6/2011     Past Surgical History:   Procedure Laterality Date     ARTHROSCOPY KNEE      09/04,Status post left knee arthroscopy     BIOPSY BREAST      Breast biopsy x 2, both benign     COLONOSCOPY      1999     EXTRACAPSULAR CATARACT EXTRATION WITH INTRAOCULAR LENS IMPLANT      2009,Bilateral cataract surgery     LAPAROSCOPIC TUBAL LIGATION      No Comments Provided     RELEASE CARPAL TUNNEL      2011,Right carpal tunnel release and trigger fingers x 2     Social History     Socioeconomic History     Marital status:      Spouse name: Not on file     Number of children: Not on file     Years of education: Not on file     Highest education level: Not on file   Occupational History     Not on file   Tobacco Use     Smoking status: Never Smoker     Smokeless tobacco: Never Used   Substance and Sexual Activity     Alcohol use: No     Drug use: Unknown     Types: Other     Comment: Drug use: No     Sexual activity: Not on file   Other Topics Concern     Not on file   Social History Narrative    .  Three children.  Retired.    Preload  6/27/2013     Social Determinants of Health     Financial Resource Strain: Not on file   Food Insecurity: Not on file   Transportation Needs: Not on file   Physical Activity: Not on file   Stress: Not on file   Social Connections: Not on file   Intimate Partner Violence: Not on file   Housing Stability: Not on file     Family History   Problem Relation Age of Onset     Heart Disease Father         Heart Disease     Cancer Sister         Cancer,head/neck     Heart Disease Brother         Heart Disease     Other - See Comments Sister         Irregular heart rhythm     Heart Disease Sister         Heart Disease     Other - See Comments Sister         MVA      Other - See Comments Brother         Multple Sclerosis     Family History Negative Brother         Good Health     Latex, Lisinopril, Penicillins, and Sulfa drugs    Skilled Nursing Facility Medication Record reviewed    End of Life Planning:   DNR comfort focused care    Review of Systems:  Review of Systems   Constitutional: Negative for fatigue.   Respiratory: Negative for cough and shortness of breath.    Cardiovascular: Negative for peripheral edema.   Gastrointestinal: Negative for abdominal distention, anal bleeding, hematochezia and vomiting.   Genitourinary: Negative for difficulty urinating.   Musculoskeletal: Negative for arthralgias.   Skin: Negative for pallor.   Psychiatric/Behavioral: Positive for confusion. Negative for agitation.       Objective:   BP (!) 143/52   Pulse 52   Temp 98  F (36.7  C)   Resp 18   Wt 49.4 kg (109 lb)   Physical Exam  Pleasant female lying in bed no acute distress.  She has fading ecchymosis along the lateral left eye but no facial swelling or tenderness.  Usual state of health otherwise.  Skin color pink.  Mucous membranes moist.  Lung fields clear to auscultation.  Cardiovascular irregular, controlled rate.  No S3.  Abdomen is soft and without masses or tenderness.  Extremities without edema.    Assessment:    ICD-10-CM    1. Late onset Alzheimer's disease without behavioral disturbance (H)  G30.1     F02.80    2. Essential hypertension  I10    3. Stage 3a chronic kidney disease (H)  N18.31    4. Permanent atrial fibrillation (H)  I48.21    5. Acquired hypothyroidism  E03.9    6. Recurrent major depressive disorder, in remission (H)  F33.40        Plan:   Family has requested per ED note no further ED/hospital visits as she is DNR with comfort focused care.  This needs to be confirmed with family by staff at skilled nursing facility.  If they are requesting to proceed with her no longer having any ED/hospital visits then this needs to be placed in her chart and  families wishes need to be followed.  Continue with same medication and treatment plan otherwise.  With her advanced severe dementia, I am in agreement with comfort focused care.    NATHALIE Ashton   2/18/2022  4:20 PM

## 2022-04-18 NOTE — PROGRESS NOTES
Subjective:  Patient is seen today for 60-day recertification.  She has PMH of hypertension, stage III chronic kidney disease,hypothyroidism and atrial fibrillation.  She is prescribed metoprolol tartrate 25 mg daily, levothyroxine 50 mcg daily, Remeron 15 mg daily, aspirin 81 mg daily and Senna-S 1 tablet twice daily.  Currently no issues with constipation.  Weight is down 3 pounds in 2 months.  Patient does receive nutritional supplements.    Patient Active Problem List   Diagnosis     Acquired hypothyroidism     Alzheimer's disease (H)     Atrial fibrillation (H)     Cerebral infarction (H)     Hypertension     Transient cerebral ischemia     Mild protein-calorie malnutrition (H)     Recurrent major depressive disorder, in remission (H)     Acute cystitis without hematuria     Chronic kidney disease, stage 3 (H)     Past Medical History:   Diagnosis Date     Agoraphobia     No Comments Provided     Anxiety disorder     No Comments Provided     Atrial fibrillation (H)     12/28/2012     Autoimmune thyroiditis     No Comments Provided     Bursopathy     4/9/2012     Carcinoma in situ     removed from upper back     Cerebral infarction (H)     12/2/2012,Acute Infarct, posterior left parietal region     Chronic kidney disease, stage III (moderate) (H)     No Comments Provided     Diffuse cystic mastopathy of breast     No Comments Provided     Disorder of cartilage     No Comments Provided     Essential (primary) hypertension     No Comments Provided     Ganglion     6/7/2012     Hypothyroidism     No Comments Provided     Noninfective gastroenteritis and colitis     No Comments Provided     Noninfective gastroenteritis and colitis     Microscopic colitis     Obesity     No Comments Provided     Pneumonitis due to inhalation of food and vomit (H)     1/14/2014     Pure hypercholesterolemia     No Comments Provided     Synovial cyst of popliteal space     9/6/2011     Past Surgical History:   Procedure Laterality Date      ARTHROSCOPY KNEE      09/04,Status post left knee arthroscopy     BIOPSY BREAST      Breast biopsy x 2, both benign     COLONOSCOPY      1999     EXTRACAPSULAR CATARACT EXTRATION WITH INTRAOCULAR LENS IMPLANT      2009,Bilateral cataract surgery     LAPAROSCOPIC TUBAL LIGATION      No Comments Provided     RELEASE CARPAL TUNNEL      2011,Right carpal tunnel release and trigger fingers x 2     Social History     Socioeconomic History     Marital status:      Spouse name: Not on file     Number of children: Not on file     Years of education: Not on file     Highest education level: Not on file   Occupational History     Not on file   Tobacco Use     Smoking status: Never Smoker     Smokeless tobacco: Never Used   Substance and Sexual Activity     Alcohol use: No     Drug use: Unknown     Types: Other     Comment: Drug use: No     Sexual activity: Not on file   Other Topics Concern     Not on file   Social History Narrative    .  Three children.  Retired.    Preload  6/27/2013     Social Determinants of Health     Financial Resource Strain: Not on file   Food Insecurity: Not on file   Transportation Needs: Not on file   Physical Activity: Not on file   Stress: Not on file   Social Connections: Not on file   Intimate Partner Violence: Not on file   Housing Stability: Not on file     Family History   Problem Relation Age of Onset     Heart Disease Father         Heart Disease     Cancer Sister         Cancer,head/neck     Heart Disease Brother         Heart Disease     Other - See Comments Sister         Irregular heart rhythm     Heart Disease Sister         Heart Disease     Other - See Comments Sister         MVA     Other - See Comments Brother         Multple Sclerosis     Family History Negative Brother         Good Health     Latex, Lisinopril, Penicillins, and Sulfa drugs    Skilled Nursing Facility Medication Record reviewed    End of Life Planning:   DNR comfort focused care    Review of  Systems:  Review of Systems   Constitutional: Negative for fatigue.   Respiratory: Negative for cough and shortness of breath.    Cardiovascular: Negative for peripheral edema.   Gastrointestinal: Negative for abdominal distention, anal bleeding, hematochezia and vomiting.   Genitourinary: Negative for difficulty urinating.   Musculoskeletal: Negative for arthralgias.   Skin: Negative for pallor.   Psychiatric/Behavioral: Positive for confusion. Negative for agitation.       Objective:   BP (!) 143/75   Pulse 60   Temp 97.5  F (36.4  C)   Resp 18   Wt 48.1 kg (106 lb)   Physical Exam  Pleasant female lying in bed no acute distress.  Usual state of health.  Skin color pink.  Mucous membranes moist.  Lung fields clear to auscultation.  Cardiovascular irregular, controlled rate.  No S3.  Abdomen is soft and without masses or tenderness.  Extremities without edema.    Assessment:    ICD-10-CM    1. Late onset Alzheimer's disease without behavioral disturbance (H)  G30.1     F02.80    2. Essential hypertension  I10    3. Stage 3a chronic kidney disease (H)  N18.31    4. Permanent atrial fibrillation (H)  I48.21    5. Acquired hypothyroidism  E03.9    6. Recurrent major depressive disorder, in remission (H)  F33.40        Plan:   Continue with same medication and treatment plan otherwise.   Check TSH and free T4 next lab day.  Continue to follow with psychiatry.    NATHALIE Ashton   4/18/2022   4:35 PM     NATHALIE Ashton   4/18/2022   3:20 PM

## 2022-04-27 NOTE — PROGRESS NOTES
"SUBJECTIVE:   Lynne Gutierrez is a 88 year old female who presents for Preventive Visit.  This is a virtual visit for Medicare annual wellness visit with patient and staff caregiver Diana Baptiste LPN.    Patient has been advised of split billing requirements and indicates understanding: Yes  Are you in the first 12 months of your Medicare coverage?  No    Healthy Habits:    In general, how would you rate your overall health?  Poor    Duration of exercise:  Less than 15 minutes    Do you usually eat at least 4 servings of fruit and vegetables a day, include whole grains    & fiber and avoid regularly eating high fat or \"junk\" foods?  Yes    Taking medications regularly:  No    Barriers to taking medications:  None    Medication side effects:  None    Ability to successfully perform activities of daily living:  Housework requires assistance, telephone requires assistance, money management requires assistance, medication administration requires assistance, laundry requires assistance, bathing requires assistance, transportation requires assistance, shopping requires assistance and preparing meals requires assistance    Home Safety:  No safety concerns identified    Hearing Impairment:  Difficulty understanding soft or whispered speech    In the past 6 months, have you been bothered by leaking of urine? Yes    In general, how would you rate your overall mental or emotional health?  Poor      PHQ-2 Total Score:    Additional concerns today:: BM q 23 days, Eating 25-50 % of meal.    Do you feel safe in your environment? Yes    Have you ever done Advance Care Planning? (For example, a Health Directive, POLST, or a discussion with a medical provider or your loved ones about your wishes): Yes, advance care planning is on file.       Fall risk     Last fall was 3 months ago.    Cognitive Screening Unable to complete due to virtual visit; need for additional assessment in future face-to-face visit  Severe dementia   BIMS " score form [place] - n/a  Do you have sleep apnea, excessive snoring or daytime drowsiness?: no    Reviewed and updated as needed this visit by clinical staff   Tobacco  Allergies  Meds  Problems  Med Hx  Surg Hx  Fam Hx            Reviewed and updated as needed this visit by Provider   Tobacco  Allergies  Meds  Problems  Med Hx  Surg Hx  Fam Hx           Social History     Tobacco Use     Smoking status: Never Smoker     Smokeless tobacco: Never Used   Substance Use Topics     Alcohol use: No     If you drink alcohol do you typically have >3 drinks per day or >7 drinks per week? No    Alcohol Use 4/27/2022   Prescreen: >3 drinks/day or >7 drinks/week? No         Review current opioid prescriptions    For a patient with a current opioid prescription:    Reviewed potential Opioid Use Disorder (OUD) risk factors: Yes no concerns    Evaluate their pain severity and current treatment plan:     Provide information on non-opioid treatment options:    Refer to a specialist, as appropriate:    Get more information on pain management in the [facility] Pain Management Best Practices Inter-agency Task Force Report    Screen for potential Substance Use Disorders (SUDs)    Reviewed the patient s potential risk factors for SUDs: Yes no concerns    Refer to treatment or specialist, as appropriate:     A screening tool isn t required but you may use one:  Find more information in the National [place] on Drug Abuse Screening and Assessment Tools Chart    Patient seen today for Medicare annual wellness visit.  She has severe Alzheimer's dementia.  She is nonverbal.  She still ambulates safely on her own.  Needs full assistance with ADLs.  Appetite good.  She is on a modified puréed diet.  She is on weight management program to improve caloric intake and nutrition as recommended by dietitian.    Current providers sharing in care for this patient include:  Patient Care Team:  French Katz MD as PCP - General (Family  Practice)  Yudith Rubio NP as Assigned PCP    The following health maintenance items are reviewed in Epic and correct as of today:  Health Maintenance Due   Topic Date Due     ZOSTER IMMUNIZATION (1 of 2) Never done     DTAP/TDAP/TD IMMUNIZATION (1 - Tdap) 09/29/2006     COVID-19 Vaccine (4 - Booster for Moderna series) 01/28/2022       no mammogram, severe dementia  Pertinent mammograms are reviewed under the imaging tab.    Review of Systems   Constitutional: Negative for chills, fatigue and fever.   HENT: Positive for trouble swallowing. Negative for congestion.         Pureed diet   Eyes: Negative for redness.   Respiratory: Negative for cough, choking and shortness of breath.    Cardiovascular: Negative for peripheral edema.   Gastrointestinal: Negative for abdominal pain, anal bleeding, constipation, diarrhea and vomiting.   Genitourinary: Negative for difficulty urinating and hematuria.   Musculoskeletal: Negative for gait problem.   Skin: Negative for wound.   Neurological: Negative for syncope.   Psychiatric/Behavioral: Positive for confusion. Negative for sleep disturbance.         OBJECTIVE:   /72 (BP Location: Right arm, Patient Position: Sitting)   Pulse 64   Temp 98  F (36.7  C)   Wt 48 kg (105 lb 14.4 oz)   SpO2 98%  There is no height or weight on file to calculate BMI.  Physical Exam  HENT:      Mouth/Throat:      Mouth: Mucous membranes are moist.   Eyes:      Conjunctiva/sclera: Conjunctivae normal.   Pulmonary:      Effort: Pulmonary effort is normal.   Neurological:      Mental Status: She is alert. She is disoriented.   Psychiatric:      Comments: Non-verbal            ASSESSMENT / PLAN:       ICD-10-CM    1. Medicare annual wellness visit, subsequent  Z00.00    2. Late onset Alzheimer's disease without behavioral disturbance (H)  G30.1     F02.80      Plan:  Medicare wellness visit complete.  Patient with severe Alzheimer's dementia.  Has order for DNR-comfort focused  care.  Patient due for Pneumovax and second COVID booster.  Patient is at risk of falls.  She is on fall prevention program at skilled nursing facility.  Continue to follow with dietitian for weight and nutrition management.  Continue 60-day certifications at [place] with MD/NP.    Patient has been advised of split billing requirements and indicates understanding: Yes    COUNSELING:  Reviewed preventive health counseling, as reflected in patient instructions       Fall risk prevention       Advanced Planning        Due for Pneumovax.  Due for second COVID booster.  These will be administered at [facility]    There is no height or weight on file to calculate BMI.        She reports that she has never smoked. She has never used smokeless tobacco.      Appropriate preventive services were discussed with this patient, including applicable screening as appropriate for cardiovascular disease, diabetes, osteopenia/osteoporosis, and glaucoma.  As appropriate for age/gender, discussed screening for colorectal cancer, prostate cancer, breast cancer, and cervical cancer. Checklist reviewing preventive services available has been given to the patient.    Reviewed patients plan of care and provided an AVS. The Basic Care Plan (routine screening as documented in Health Maintenance) for Lynne meets the Care Plan requirement. This Care Plan has been established and reviewed with the caregiver.    Counseling Resources:  ATP IV Guidelines  Pooled Cohorts Equation Calculator  Breast Cancer Risk Calculator  Breast Cancer: Medication to Reduce Risk  FRAX Risk Assessment  ICSI Preventive Guidelines  Dietary Guidelines for Americans, 2010  USDA's MyPlate  ASA Prophylaxis  Lung CA Screening    Yudith Rubio NP  [facility]    Identified Health Risks:

## 2022-05-19 NOTE — PROGRESS NOTES
Lynne Gutierrez is a 88 year old female being seen today for comprehensive and regulatory visit at [facility].    Code Status: DNR / DNI.   Health Care Power of : Extended Emergency Contact Information  Primary Emergency Contact: Ethan Gutierrez   [facility]  Home Phone: [phone]  Relation: Spouse  Secondary Emergency Contact: Chris Gutierrez  Mobile Phone: [phone]  Relation: Son     Allergies: Latex, Lisinopril, Penicillins, and Sulfa drugs     Chief Complaint / HPI: stable, without significant changes. Was walking a bit more last week. Is non verbal, scant weight loss.    Past Medical, Surgical, Family and Social History reviewed: YES.     Medications:     Current Outpatient Medications   Medication     aspirin 81 MG chewable tablet     levothyroxine (SYNTHROID/LEVOTHROID) 50 MCG tablet     magnesium hydroxide (MILK OF MAGNESIA) 400 MG/5ML suspension     metoprolol tartrate (LOPRESSOR) 25 MG tablet     mirtazapine (REMERON) 15 MG tablet     oseltamivir (TAMIFLU) 30 MG capsule     STIMULANT LAXATIVE 8.6-50 MG tablet     No current facility-administered medications for this visit.       Medications - recent changes: none    Review of Systems:  unable  Toileting:    Continent of Bowel: No   Continent of Bladder: No  Mobility: ambulates    Recent Labs: None    Pertinent Screening Tool results: None    Current Therapies: none    Exam:  Vital signs reviewed. 117/53; 54; 18; 103.9#  GENERAL APPEARANCE: healthy, alert and no distress  RESP: lungs clear to auscultation - no rales, rhonchi or wheezes  CV: regular rate and rhythm, normal S1 S2, no S3 or S4, no murmur, click or rub, no peripheral edema and peripheral pulses strong  ABDOMEN: soft, nontender, no hepatosplenomegaly, no masses and bowel sounds normal  NEURO: smiles at me, mumbles, is pleasant and allows an exam.     Assessment and Plan:  (  (G30.9,  F02.80) Alzheimer's disease (H)  (primary encounter diagnosis)  Comment: severe  Plan: no changes, is  clearly comfortable    (E03.9) Acquired hypothyroidism  Comment: stable  Plan:      (I10) Primary hypertension  Comment: stable   Plan:

## 2022-07-13 NOTE — PROGRESS NOTES
Subjective:  Patient is seen today for 60-day recertification.  She has history of hypertension, stage III chronic kidney disease,hypothyroidism and atrial fibrillation.  Currently receives metoprolol tartrate 25 mg daily, levothyroxine 50 mcg daily, Remeron 15 mg daily, aspirin 81 mg daily and Senna-S 1 tablet twice daily.  Weight is down 3 pounds in the past month.  Her  had a short stay at [facility] but recently was discharged home on hospice.  Patient has had no changes in depression or behaviors.    Patient Active Problem List   Diagnosis     Acquired hypothyroidism     Alzheimer's disease (H)     Atrial fibrillation (H)     Cerebral infarction (H)     Hypertension     Transient cerebral ischemia     Mild protein-calorie malnutrition (H)     Recurrent major depressive disorder, in remission (H)     Acute cystitis without hematuria     Chronic kidney disease, stage 3 (H)     Past Medical History:   Diagnosis Date     Agoraphobia     No Comments Provided     Anxiety disorder     No Comments Provided     Atrial fibrillation (H)     12/28/2012     Autoimmune thyroiditis     No Comments Provided     Bursopathy     4/9/2012     Carcinoma in situ     removed from upper back     Cerebral infarction (H)     12/2/2012,Acute Infarct, posterior left parietal region     Chronic kidney disease, stage III (moderate) (H)     No Comments Provided     Diffuse cystic mastopathy of breast     No Comments Provided     Disorder of cartilage     No Comments Provided     Essential (primary) hypertension     No Comments Provided     Ganglion     6/7/2012     Hypothyroidism     No Comments Provided     Noninfective gastroenteritis and colitis     No Comments Provided     Noninfective gastroenteritis and colitis     Microscopic colitis     Obesity     No Comments Provided     Pneumonitis due to inhalation of food and vomit (H)     1/14/2014     Pure hypercholesterolemia     No Comments Provided     Synovial cyst of popliteal space      9/6/2011     Past Surgical History:   Procedure Laterality Date     ARTHROSCOPY KNEE      09/04,Status post left knee arthroscopy     BIOPSY BREAST      Breast biopsy x 2, both benign     COLONOSCOPY      1999     EXTRACAPSULAR CATARACT EXTRATION WITH INTRAOCULAR LENS IMPLANT      2009,Bilateral cataract surgery     LAPAROSCOPIC TUBAL LIGATION      No Comments Provided     RELEASE CARPAL TUNNEL      2011,Right carpal tunnel release and trigger fingers x 2     Social History     Socioeconomic History     Marital status:      Spouse name: Not on file     Number of children: Not on file     Years of education: Not on file     Highest education level: Not on file   Occupational History     Not on file   Tobacco Use     Smoking status: Never Smoker     Smokeless tobacco: Never Used   Substance and Sexual Activity     Alcohol use: No     Drug use: Unknown     Types: Other     Comment: Drug use: No     Sexual activity: Not on file   Other Topics Concern     Not on file   Social History Narrative    .  Three children.  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/2013     Social Determinants of Health     Financial Resource Strain: Not on file   Food Insecurity: Not on file   Transportation Needs: Not on file   Physical Activity: Not on file   Stress: Not on file   Social Connections: Not on file   Intimate Partner Violence: Not on file   Housing Stability: Not on file     Family History   Problem Relation Age of Onset     Heart Disease Father         Heart Disease     Cancer Sister         Cancer,head/neck     Heart Disease Brother         Heart Disease     Other - See Comments Sister         Irregular heart rhythm     Heart Disease Sister         Heart Disease     Other - See Comments Sister         MVA     Other - See Comments Brother         Multple Sclerosis     Family History Negative Brother         Good Health     Latex, Lisinopril, Penicillins, and Sulfa drugs    Skilled Nursing Facility  Medication Record reviewed    End of Life Planning:   DNR comfort focused care    Review of Systems:  Review of Systems   Constitutional: Negative for fatigue.   Respiratory: Negative for cough and shortness of breath.    Cardiovascular: Negative for peripheral edema.   Gastrointestinal: Negative for abdominal distention, anal bleeding, hematochezia and vomiting.   Genitourinary: Negative for difficulty urinating.   Musculoskeletal: Negative for arthralgias.   Skin: Negative for pallor.   Psychiatric/Behavioral: Positive for confusion. Negative for agitation.       Objective:   /53   Pulse 64   Temp 98  F (36.7  C)   Resp 18   Wt 46.3 kg (102 lb)   SpO2 97%   Physical Exam  Pleasant female sitting at edge of bed in no acute distress.  Advanced dementia.  Skin color pink.  Mucous membranes moist.  Lung fields clear to auscultation.  Cardiovascular irregular, controlled rate.  No S3.  Abdomen is soft and without masses or tenderness.  Extremities without edema.    Assessment:    ICD-10-CM    1. Late onset Alzheimer's disease without behavioral disturbance (H)  G30.1     F02.80    2. Acquired hypothyroidism  E03.9    3. Primary hypertension  I10    4. Stage 3a chronic kidney disease (H)  N18.31    5. Permanent atrial fibrillation (H)  I48.21    6. Recurrent major depressive disorder, in remission (H)  F33.40        Plan:   Continue with same medication and treatment plan otherwise.  Last thyroid labs stable in April 2022.  No further lab work needed at this time.    NATHALIE Ashton   7/13/2022  3:41 PM

## 2022-09-12 NOTE — PROGRESS NOTES
Subjective:  Patient is seen today for a fall this afternoon.  She fell and hit her head and shoulder.  She has severe advanced dementia.  Family requested evaluation at skilled nursing facility and only sent to emergency department if absolutely necessary.  Nursing staff report that she is at her baseline.  With fall patient was noted to have a bruise on her forehead, bruise at right shoulder and scrape on her right knee.  She does receive aspirin 81 mg daily but no other antiplatelets or anticoagulants.    Patient Active Problem List   Diagnosis     Acquired hypothyroidism     Alzheimer's disease (H)     Atrial fibrillation (H)     Cerebral infarction (H)     Hypertension     Transient cerebral ischemia     Mild protein-calorie malnutrition (H)     Recurrent major depressive disorder, in remission (H)     Acute cystitis without hematuria     Chronic kidney disease, stage 3 (H)     Past Medical History:   Diagnosis Date     Agoraphobia     No Comments Provided     Anxiety disorder     No Comments Provided     Atrial fibrillation (H)     12/28/2012     Autoimmune thyroiditis     No Comments Provided     Bursopathy     4/9/2012     Carcinoma in situ     removed from upper back     Cerebral infarction (H)     12/2/2012,Acute Infarct, posterior left parietal region     Chronic kidney disease, stage III (moderate) (H)     No Comments Provided     Diffuse cystic mastopathy of breast     No Comments Provided     Disorder of cartilage     No Comments Provided     Essential (primary) hypertension     No Comments Provided     Ganglion     6/7/2012     Hypothyroidism     No Comments Provided     Noninfective gastroenteritis and colitis     No Comments Provided     Noninfective gastroenteritis and colitis     Microscopic colitis     Obesity     No Comments Provided     Pneumonitis due to inhalation of food and vomit (H)     1/14/2014     Pure hypercholesterolemia     No Comments Provided     Synovial cyst of popliteal space      9/6/2011     Past Surgical History:   Procedure Laterality Date     ARTHROSCOPY KNEE      09/04,Status post left knee arthroscopy     BIOPSY BREAST      Breast biopsy x 2, both benign     COLONOSCOPY      1999     EXTRACAPSULAR CATARACT EXTRATION WITH INTRAOCULAR LENS IMPLANT      2009,Bilateral cataract surgery     LAPAROSCOPIC TUBAL LIGATION      No Comments Provided     RELEASE CARPAL TUNNEL      2011,Right carpal tunnel release and trigger fingers x 2     Social History     Socioeconomic History     Marital status:      Spouse name: Not on file     Number of children: Not on file     Years of education: Not on file     Highest education level: Not on file   Occupational History     Not on file   Tobacco Use     Smoking status: Never Smoker     Smokeless tobacco: Never Used   Substance and Sexual Activity     Alcohol use: No     Drug use: Unknown     Types: Other     Comment: Drug use: No     Sexual activity: Not on file   Other Topics Concern     Not on file   Social History Narrative    .  Three children.  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[employer], [place] by [employer]  6/27/2013     Social Determinants of Health     Financial Resource Strain: Not on file   Food Insecurity: Not on file   Transportation Needs: Not on file   Physical Activity: Not on file   Stress: Not on file   Social Connections: Not on file   Intimate Partner Violence: Not on file   Housing Stability: Not on file     Family History   Problem Relation Age of Onset     Heart Disease Father         Heart Disease     Cancer Sister         Cancer,head/neck     Heart Disease Brother         Heart Disease     Other - See Comments Sister         Irregular heart rhythm     Heart Disease Sister         Heart Disease     Other - See Comments Sister         MVA     Other - See Comments Brother         Multple Sclerosis     Family History Negative Brother         Good Health     Latex, Lisinopril, Penicillins, and Sulfa drugs    Skilled Nursing Facility  Medication Record reviewed    End of Life Planning:   DNR comfort focused care    Review of Systems:  Review of Systems  See HPI  Objective:   /74   Pulse 99   Temp 96.8  F (36  C)   Resp 18   Wt 47.2 kg (104 lb)   Physical Exam  Elderly female with severe dementia nonverbal.  She is lying in bed and appears comfortable.  She is awake and alert.  She does have ecchymosis along the mid forehead.  She is lying on the right side onto the right shoulder.  She will not set up for me to evaluate the shoulder.  Right knee with a small superficial abrasion.  She is able to the extend the knee.  There is no swelling noted.  I was not able to get patient to stand or walk due to severe dementia.    Assessment:    ICD-10-CM    1. Fall, initial encounter  W19.XXXA    2. Closed head injury, initial encounter  S09.90XA    3. Late onset Alzheimer's disease without behavioral disturbance (H)  G30.1     F02.80        Plan:   Patient had a fall with closed head injury, injury to right shoulder and right knee.  I am really not able to assess the right shoulder but she is able to lie on the right shoulder and appears comfortable.  Nursing staff have started neurochecks per their standing orders.  Would recommend that patient be continue to monitor unless there is a change in her cognitive status.  If she has diminished range of motion of right shoulder and knee or pain with moving joint then may need x-ray.      NATHALIE Ashton   9/12/2022  5:06 PM

## 2022-09-26 NOTE — PROGRESS NOTES
Lynne Gutierrez is a 89 year old female being seen today for regulatory visit at [facility].    Code Status: DNR / DNI.   Health Care Power of : Extended Emergency Contact Information  Primary Emergency Contact: Ethan Gutierrez   [facility]  Home Phone: [phone]  Relation: Spouse  Secondary Emergency Contact: Chris Gutierrez  Mobile Phone: [phone]  Relation: Son     Allergies: Latex, Lisinopril, Penicillins, and Sulfa drugs     Chief Complaint / HPI: Lynne Gutierrez is a 89 year old female is seen today at [facility].  She has had a recent fall with abrasions and forehead contusion.  These have healed.        Patient Active Problem List   Diagnosis     Acquired hypothyroidism     Alzheimer's disease (H)     Atrial fibrillation (H)     Cerebral infarction (H)     Hypertension     Transient cerebral ischemia     Mild protein-calorie malnutrition (H)     Recurrent major depressive disorder, in remission (H)     Acute cystitis without hematuria     Chronic kidney disease, stage 3 (H)         Past Medical, Surgical, Family and Social History reviewed: YES.       Current Outpatient Medications   Medication     aspirin 81 MG chewable tablet     levothyroxine (SYNTHROID/LEVOTHROID) 50 MCG tablet     magnesium hydroxide (MILK OF MAGNESIA) 400 MG/5ML suspension     metoprolol tartrate (LOPRESSOR) 25 MG tablet     mirtazapine (REMERON) 15 MG tablet     STIMULANT LAXATIVE 8.6-50 MG tablet     No current facility-administered medications for this visit.       Medications - recent changes: none    Review of Systems:        Toileting:    Continent of Bowel: no   Continent of Bladder: no  Mobility: wheelchair    Recent Labs:   Lab Results   Component Value Date    WBC 2.2 06/01/2021     Lab Results   Component Value Date    RBC 4.26 06/01/2021     Lab Results   Component Value Date    HGB 13.4 06/01/2021     Lab Results   Component Value Date    HCT 40.5 06/01/2021     Lab Results   Component  Value Date    MCV 95 06/01/2021     Lab Results   Component Value Date    MCH 31.5 06/01/2021     Lab Results   Component Value Date    MCHC 33.1 06/01/2021     Lab Results   Component Value Date    RDW 14.6 06/01/2021     Lab Results   Component Value Date     06/01/2021       Last Comprehensive Metabolic Panel:  Sodium   Date Value Ref Range Status   03/30/2021 143 134 - 144 mmol/L Final     Potassium   Date Value Ref Range Status   03/30/2021 4.1 3.5 - 5.1 mmol/L Final     Chloride   Date Value Ref Range Status   03/30/2021 109 (H) 98 - 107 mmol/L Final     Carbon Dioxide   Date Value Ref Range Status   03/30/2021 28 21 - 31 mmol/L Final     Anion Gap   Date Value Ref Range Status   03/30/2021 6 3 - 14 mmol/L Final     Glucose   Date Value Ref Range Status   03/30/2021 80 70 - 105 mg/dL Final     Urea Nitrogen   Date Value Ref Range Status   03/30/2021 19 7 - 25 mg/dL Final     Creatinine   Date Value Ref Range Status   03/30/2021 0.96 0.60 - 1.20 mg/dL Final     GFR Estimate   Date Value Ref Range Status   03/30/2021 55 (L) >60 mL/min/[1.73_m2] Final     Calcium   Date Value Ref Range Status   03/30/2021 9.1 8.6 - 10.3 mg/dL Final     No results found for: A1C  No results found for: CHOL  Lab Results   Component Value Date    HDL 39 07/23/2014     Lab Results   Component Value Date     07/23/2014     Lab Results   Component Value Date    TRIG 160 07/23/2014     No results found for: CHOLHDLRATIO  TSH   Date Value Ref Range Status   04/19/2022 0.62 0.40 - 4.00 mU/L Final       HCM items due:  Flu vaccine per nursing home policy     Pertinent Screening Tool results: None      Current Therapies: none    Exam:  Vital signs reviewed. BPs /54-64  Patient is awake; resists exam  No current contusions or abrasions     Assessment and Plan:    ICD-10-CM    1. Late onset Alzheimer's disease without behavioral disturbance (H)  G30.1     F02.80    2. Acquired hypothyroidism  E03.9    3. Primary  hypertension  I10    4. Stage 3a chronic kidney disease (H)  N18.31          1.  Plan of care reviewed and signed.  No changes made.  2. Up to date on labs    Carmel Gayle MD

## 2024-06-04 NOTE — TELEPHONE ENCOUNTER
No protocol for requested medication.    Medication: Zolpidem  Last office visit date: 04/05/2024  Pharmacy: [facility] - [place], [place] - [address]    Order pended, routed to clinician for review.     Qty: 30  Days: 30  Refills: 1    Prescribed: 03/18/2024  Dispensed: 05/10/2024  Sold: 05/14/2024   Notified [facility] and medication needs to be sent to pharmacy. Medication teed up.  Norma J. Gosselin, LPN .......  8/26/2021  2:58 PM